# Patient Record
Sex: FEMALE | Race: WHITE | NOT HISPANIC OR LATINO | URBAN - METROPOLITAN AREA
[De-identification: names, ages, dates, MRNs, and addresses within clinical notes are randomized per-mention and may not be internally consistent; named-entity substitution may affect disease eponyms.]

---

## 2022-02-03 ENCOUNTER — NEW PATIENT (OUTPATIENT)
Dept: URBAN - METROPOLITAN AREA CLINIC 6 | Facility: CLINIC | Age: 67
End: 2022-02-03

## 2022-02-03 DIAGNOSIS — Z96.1: ICD-10-CM

## 2022-02-03 DIAGNOSIS — H40.041: ICD-10-CM

## 2022-02-03 DIAGNOSIS — H40.1111: ICD-10-CM

## 2022-02-03 DIAGNOSIS — H25.812: ICD-10-CM

## 2022-02-03 PROCEDURE — 92004 COMPRE OPH EXAM NEW PT 1/>: CPT

## 2022-02-03 PROCEDURE — 92020 GONIOSCOPY: CPT

## 2022-02-03 PROCEDURE — 92202 OPSCPY EXTND ON/MAC DRAW: CPT

## 2022-02-03 PROCEDURE — 92133 CPTRZD OPH DX IMG PST SGM ON: CPT

## 2022-02-03 PROCEDURE — 76514 ECHO EXAM OF EYE THICKNESS: CPT

## 2022-02-03 ASSESSMENT — TONOMETRY
OD_IOP_MMHG: 20
OS_IOP_MMHG: 15

## 2022-02-03 ASSESSMENT — VISUAL ACUITY
OD_CC: J5
OD_CC: 20/60+2
OS_PH: 20/40
OS_CC: 20/50
OS_CC: J2

## 2022-03-17 ENCOUNTER — IOP CHECK (OUTPATIENT)
Dept: URBAN - METROPOLITAN AREA CLINIC 6 | Facility: CLINIC | Age: 67
End: 2022-03-17

## 2022-03-17 DIAGNOSIS — Z96.1: ICD-10-CM

## 2022-03-17 DIAGNOSIS — H40.041: ICD-10-CM

## 2022-03-17 DIAGNOSIS — H25.812: ICD-10-CM

## 2022-03-17 DIAGNOSIS — H40.1111: ICD-10-CM

## 2022-03-17 PROCEDURE — 92083 EXTENDED VISUAL FIELD XM: CPT

## 2022-03-17 PROCEDURE — 92012 INTRM OPH EXAM EST PATIENT: CPT

## 2022-03-17 ASSESSMENT — VISUAL ACUITY
OS_PH: 20/30+2
OD_CC: 20/50+2
OS_CC: 20/40-2

## 2022-03-17 ASSESSMENT — TONOMETRY
OS_IOP_MMHG: 28
OD_IOP_MMHG: 21

## 2022-04-28 ENCOUNTER — IOP CHECK (OUTPATIENT)
Dept: URBAN - METROPOLITAN AREA CLINIC 6 | Facility: CLINIC | Age: 67
End: 2022-04-28

## 2022-04-28 DIAGNOSIS — H40.1111: ICD-10-CM

## 2022-04-28 DIAGNOSIS — H40.041: ICD-10-CM

## 2022-04-28 PROCEDURE — 92012 INTRM OPH EXAM EST PATIENT: CPT

## 2022-04-28 ASSESSMENT — VISUAL ACUITY
OD_PH: 20/50
OS_PH: 20/30
OS_CC: 20/40
OD_CC: 20/60

## 2022-04-28 ASSESSMENT — TONOMETRY
OS_IOP_MMHG: 21
OD_IOP_MMHG: 45

## 2023-07-05 ENCOUNTER — APPOINTMENT (OUTPATIENT)
Dept: RADIOLOGY | Facility: MEDICAL CENTER | Age: 68
End: 2023-07-05
Payer: MEDICARE

## 2023-07-05 ENCOUNTER — OFFICE VISIT (OUTPATIENT)
Dept: OBGYN CLINIC | Facility: MEDICAL CENTER | Age: 68
End: 2023-07-05
Payer: MEDICARE

## 2023-07-05 VITALS
BODY MASS INDEX: 29.94 KG/M2 | DIASTOLIC BLOOD PRESSURE: 78 MMHG | HEART RATE: 74 BPM | WEIGHT: 175.4 LBS | SYSTOLIC BLOOD PRESSURE: 136 MMHG | HEIGHT: 64 IN

## 2023-07-05 DIAGNOSIS — M25.562 LEFT KNEE PAIN, UNSPECIFIED CHRONICITY: ICD-10-CM

## 2023-07-05 DIAGNOSIS — Z01.89 ENCOUNTER FOR LOWER EXTREMITY COMPARISON IMAGING STUDY: ICD-10-CM

## 2023-07-05 DIAGNOSIS — M17.12 PRIMARY OSTEOARTHRITIS OF LEFT KNEE: Primary | ICD-10-CM

## 2023-07-05 DIAGNOSIS — Z01.818 PREOPERATIVE TESTING: ICD-10-CM

## 2023-07-05 DIAGNOSIS — M17.12 PRIMARY OSTEOARTHRITIS OF LEFT KNEE: ICD-10-CM

## 2023-07-05 DIAGNOSIS — Z01.818 PREOPERATIVE TESTING: Primary | ICD-10-CM

## 2023-07-05 PROCEDURE — 99204 OFFICE O/P NEW MOD 45 MIN: CPT | Performed by: ORTHOPAEDIC SURGERY

## 2023-07-05 PROCEDURE — 77073 BONE LENGTH STUDIES: CPT

## 2023-07-05 PROCEDURE — 73564 X-RAY EXAM KNEE 4 OR MORE: CPT

## 2023-07-05 PROCEDURE — 73560 X-RAY EXAM OF KNEE 1 OR 2: CPT

## 2023-07-05 RX ORDER — SODIUM CHLORIDE 9 MG/ML
75 INJECTION, SOLUTION INTRAVENOUS CONTINUOUS
OUTPATIENT
Start: 2023-07-05

## 2023-07-05 RX ORDER — CHLORHEXIDINE GLUCONATE 4 G/100ML
SOLUTION TOPICAL DAILY PRN
OUTPATIENT
Start: 2023-07-05

## 2023-07-05 RX ORDER — ASCORBIC ACID 500 MG
500 TABLET ORAL DAILY
Qty: 30 TABLET | Refills: 1 | Status: SHIPPED | OUTPATIENT
Start: 2023-07-05

## 2023-07-05 RX ORDER — TRANEXAMIC ACID 10 MG/ML
1000 INJECTION, SOLUTION INTRAVENOUS ONCE
OUTPATIENT
Start: 2023-07-05 | End: 2023-07-05

## 2023-07-05 RX ORDER — FERROUS SULFATE TAB EC 324 MG (65 MG FE EQUIVALENT) 324 (65 FE) MG
324 TABLET DELAYED RESPONSE ORAL DAILY
Qty: 30 TABLET | Refills: 1 | Status: SHIPPED | OUTPATIENT
Start: 2023-07-05

## 2023-07-05 RX ORDER — CEFAZOLIN SODIUM 2 G/50ML
2000 SOLUTION INTRAVENOUS ONCE
OUTPATIENT
Start: 2023-07-05 | End: 2023-07-05

## 2023-07-05 RX ORDER — FOLIC ACID 1 MG/1
1 TABLET ORAL DAILY
Qty: 30 TABLET | Refills: 1 | Status: SHIPPED | OUTPATIENT
Start: 2023-07-05

## 2023-07-05 RX ORDER — MULTIVITAMIN
1 TABLET ORAL DAILY
Qty: 30 TABLET | Refills: 1 | Status: SHIPPED | OUTPATIENT
Start: 2023-07-05

## 2023-07-05 RX ORDER — BETAMETHASONE DIPROPIONATE 0.5 MG/G
OINTMENT TOPICAL
COMMUNITY
Start: 2023-06-30

## 2023-07-05 RX ORDER — BRIMONIDINE TARTRATE 1 MG/ML
SOLUTION/ DROPS OPHTHALMIC
COMMUNITY
Start: 2023-06-30

## 2023-07-05 RX ORDER — CHLORHEXIDINE GLUCONATE 0.12 MG/ML
15 RINSE ORAL ONCE
OUTPATIENT
Start: 2023-07-05 | End: 2023-07-05

## 2023-07-05 RX ORDER — TAFLUPROST OPTHALMIC 0 MG/.3ML
SOLUTION/ DROPS OPHTHALMIC
COMMUNITY
Start: 2023-07-01

## 2023-07-05 RX ORDER — ACETAMINOPHEN 325 MG/1
975 TABLET ORAL ONCE
OUTPATIENT
Start: 2023-07-05 | End: 2023-07-05

## 2023-07-05 NOTE — PROGRESS NOTES
Assessment/Plan     1. Primary osteoarthritis of left knee    2. Left knee pain, unspecified chronicity    3. Encounter for lower extremity comparison imaging study    4. Preoperative testing      Orders Placed This Encounter   Procedures   • XR knee 4+ vw left injury   • XR knee 1 or 2 vw right   • XR bone length (scanogram)   • Comprehensive metabolic panel   • Hemoglobin A1C W/EAG Estimation   • CBC and differential   • C-reactive protein   • Anemia Panel w/Reflex   • Ambulatory referral to Physical Therapy   • Ambulatory referral to Physical Therapy   • Ambulatory referral to Internal Medicine   • Type and screen     • Patient presents with severe left knee osteoarthritis. • Discussed conservative treatment as well as risks and benefits of these treatment options. • Add in Tylenol as needed for pain. 1,000 mg up to 3 times a day. Do not exceed 3,000 mg per day. Ebenezer Gao has severe bilateral knee arthritis. She has tried conservative treatment without adequate relief. We discussed treatment options as well as risks and benefits of treatment options. The patient would like to proceed with a left total knee arthroplasty. The risks of surgery include, but are not limited to infection, blood clot, wound healing problems, blood loss, damage to blood vessels and nerves, persistent pain and stiffness, fracture, need for additional surgery, need for revision surgery, failure of hardware, heart attack, stroke, death. The patient understood and agreed to by oral and written consent. I answered all questions regarding surgery. The patient will be on ASA for DVT prophylaxis. • The patient will obtain clearance from [facility], and Cardiology if [facility] deems necessary. • Potential same day discharge. Her sister will be at home with her after surgery  Reviewed with patient to expect some numbness over the lateral aspect of the knee after surgery.   We let the patient know to avoid kneeling while the incision is healing, typically for the first 4-6 weeks. Once incision is healed kneeling if permitted but may still be uncomfortable for some patients long term. Reviewed no driving for 4-6 weeks for right sided surgery once off narcotics. No driving until off narcotics for left sided surgery. Preoperative vitamins were prescribed. Patient instructed to  what they are not already taking and start 30 days before surgery. We let the patient know that some people experience constipation while on iron. If that occurs can take iron every other day. If still an issue can stop the iron. Can also add in OTC medication and/or prune juice for constipation. • We also let the patient know that some people experience constipation after surgery while on narcotics. We suggest to have OTC medications and/or prune juice available if needed. Return for postop appt. I answered all of the patient's questions during the visit and provided education of the patient's condition during the visit. The patient verbalized understanding of the information given and agrees with the plan. This note was dictated using [employer] software. It may contain errors including improperly dictated words. Please contact physician directly for any questions. History of Present Illness   Chief complaint:   Chief Complaint   Patient presents with   • Left Knee - Pain       HPI: Teofilo Mg is a 75 y.o. female that c/o left knee pain. Pain has been present for many years, as she has been working retail for 20 years and notes pain and instability in her knee throughout this time. Pain is located posterior, and described as a dull "zing", although her pain complaint is the instability in her knee. Patient denies any previous injuries or surgeries. Pain is aggravated  by quick movements, and symptoms include swelling, clicking, and isntability. Patient denies any radiating pain or distal paresthesias.  Pain is alleviated by non weight-bearing, ice, Tylenol, ibuprofen. Patient has not initiated PT or injections, and has tried compression sleeves with no recent relief. She is currently ambulating with a cane. She notes a bad experience with attempting an injection to the right knee years ago and would not like to repeat that experience. She is interested in pursuing a left knee replacement. History of MRSA: no  History of blood clots: no  Family history of blood clots: no  History of Hepatitis C:no  History of HIV: no  Are you a smoker:no  Are you diabetic:no  Do you see a cardiologist: no  Have you been vaccinated for COVID:yes  Have you seen a dentist in the past year: yes         ROS:    See [facility] for musculoskeletal review. All other systems reviewed are negative     Historical Information   Past Medical History:   Diagnosis Date   • Left knee pain      Past Surgical History:   Procedure Laterality Date   • EYE SURGERY     • KNEE SURGERY       Social History   Social History     Substance and Sexual Activity   Alcohol Use None     Social History     Substance and Sexual Activity   Drug Use Not on file     Social History     Tobacco Use   Smoking Status Not on file   Smokeless Tobacco Not on file     Family History: History reviewed. No pertinent family history. Current Outpatient Medications on File Prior to Visit   Medication Sig Dispense Refill   • Alphagan P 0.1 % INSTILL ONE DROP INTO THE RIGHT EYE EVERY 12 HOURS     • betamethasone, augmented, (DIPROLENE) 0.05 % ointment      • Tafluprost, PF, 0.0015 % SOLN        No current facility-administered medications on file prior to visit. Allergies   Allergen Reactions   • Other Other (See Comments) and Rash     Pt states "it turned me red from my neck up".        Current Outpatient Medications on File Prior to Visit   Medication Sig Dispense Refill   • Alphagan P 0.1 % INSTILL ONE DROP INTO THE RIGHT EYE EVERY 12 HOURS     • betamethasone, augmented, (DIPROLENE) 0.05 % ointment • Tafluprost, PF, 0.0015 % SOLN        No current facility-administered medications on file prior to visit. Objective   Vitals: Blood pressure 136/78, pulse 74, height 5' 4" (1.626 m), weight 79.6 kg (175 lb 6.4 oz). ,Body mass index is 30.11 kg/m².     PE:  AAOx 3  WDWN  Hearing intact, no drainage from eyes  Regular rate  no audible wheezing  no abdominal distension  LE compartments soft, skin intact    leftknee:    Appearance:  no swelling   No ecchymosis  no obvious joint deformity   No effusion  Palpation/Tenderness:  No TTP over medial joint line  No TTP over lateral joint line   No TTP over patella  No TTP over patellar tendon  + TTP over pes anserine bursa  Active Range of Motion:  AROM:   PROM:    Special Tests:  Patellar grind:  Negative  Valgus Stress Test:  negative  Varus Stress Test:  negative     No ipsilateral hip pain with ROM    leftLE:    Sensation grossly intact  Palpable pulse  AT/GS/EHL intact    RLE:  EHL/AT/GS/quads/hamstrings/iliopsoas 5/5, sensation grossly intact L4, L5, S1, palpable pedal pulse  LLE:  EHL/AT/GS/quads/hamstrings/iliopsoas 5/5, sensation grossly intact L4, L5, S1, palpable pedal pulse      Imaging Studies: I have personally reviewed pertinent films in PACS  XR leftknee:    Severe bilateral knee arthritis with joint space narrowing and osteophyte formation      Scribe Attestation    I,:  Melania Mota am acting as a scribe while in the presence of the attending physician.:       I,:  Merline Daniel, DO personally performed the services described in this documentation    as scribed in my presence.:

## 2023-07-13 ENCOUNTER — EVALUATION (OUTPATIENT)
Dept: PHYSICAL THERAPY | Facility: REHABILITATION | Age: 68
End: 2023-07-13
Payer: MEDICARE

## 2023-07-13 DIAGNOSIS — M17.12 PRIMARY OSTEOARTHRITIS OF LEFT KNEE: Primary | ICD-10-CM

## 2023-07-13 PROCEDURE — 97161 PT EVAL LOW COMPLEX 20 MIN: CPT | Performed by: PHYSICAL THERAPIST

## 2023-07-13 PROCEDURE — 97110 THERAPEUTIC EXERCISES: CPT | Performed by: PHYSICAL THERAPIST

## 2023-07-13 NOTE — PROGRESS NOTES
PT Evaluation     Today's date: 2023  Patient name: Kaitlin Arriaga  : 1955  MRN: [id number]  Referring provider: Carlos Potter  Dx:   Encounter Diagnosis     ICD-10-CM    1. Primary osteoarthritis of left knee  M17.12 Ambulatory referral to Physical Therapy                     Assessment  Assessment details: Kaitlin Arriaga is a 75 y.o. female referred to outpt PT with dx of left knee OA with TKA scheduled 10/25/23. Pt presents with decrease in left knee ROM flex and ext with positive pain. Pt funct is limited with decrease in tolerance to ambulation, sit to stand transfers, and diff getting up from floor. Pt was issued supplemental HEP at today's visit to assist and focus on ROM. Discussed with pt regarding pain control and when it's okay to push through ROM and pain versus when to be less aggressive. Will f/u for pre-op PT visit closer to surgery date. Impairments: lacks appropriate home exercise program    Goals  Independent with HEP x1 visit-met    Plan  Planned therapy interventions: home exercise program        Subjective Evaluation    History of Present Illness  Mechanism of injury: Pt reports having severe left knee OA and is scheduled for TKA 10/25/23. When pt went to see ortho, she recommended that she have some PT prior to surgery to assist with extension. Pt had right meniscus 15 years prior and feels she has been modifying since for bilat knee sx's. Pt has diff with sit to stand transfers in which she needs assistance from her UE. 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 Pt is currently sleeping with pillow between knees with good tolerance and min sleep disturbances. Pt is using tylenol to assist with sx's. Pt is using SPC when amb for longer duration and when out in community, no use of AD within short distances within her home.   Pt avoids motions of bilat knee secondary to pain and diff with movements. Pt does feel unsteady initially when putting pressure through left LE, but dissipates quickly and denies any falls. Pain  Current pain ratin  At best pain ratin  At worst pain ratin      Diagnostic Tests  X-ray: abnormal        Objective     Active Range of Motion   Left Knee   Flexion: 85 degrees   Extension: -25 degrees     Passive Range of Motion   Left Knee   Flexion: 90 degrees   Extension: -20 degrees     Strength/Myotome Testing     Left Knee   Flexion: 5  Extension: 4+    Right Knee   Flexion: 5  Extension: 5    Additional Strength Details  SLR without lag but restricted into extension due to ROM limitations.             Precautions: None        Manual  23                                                                                   Neuro Re-Ed                                                                                                  Therex            Knee ext TERT 2 min x3          heel slides sitting 5"x10                                                                                                               Gait Training                                 Modalities

## 2023-08-14 ENCOUNTER — LAB REQUISITION (OUTPATIENT)
Dept: LAB | Facility: HOSPITAL | Age: 68
End: 2023-08-14
Payer: MEDICARE

## 2023-08-14 ENCOUNTER — OFFICE VISIT (OUTPATIENT)
Dept: LAB | Facility: HOSPITAL | Age: 68
End: 2023-08-14
Payer: MEDICARE

## 2023-08-14 ENCOUNTER — APPOINTMENT (OUTPATIENT)
Dept: LAB | Facility: HOSPITAL | Age: 68
End: 2023-08-14
Payer: MEDICARE

## 2023-08-14 DIAGNOSIS — Z01.818 ENCOUNTER FOR PREADMISSION TESTING: ICD-10-CM

## 2023-08-14 DIAGNOSIS — M25.562 LEFT KNEE PAIN, UNSPECIFIED CHRONICITY: ICD-10-CM

## 2023-08-14 DIAGNOSIS — Z01.818 PREOPERATIVE TESTING: ICD-10-CM

## 2023-08-14 DIAGNOSIS — Z01.818 ENCOUNTER FOR OTHER PREPROCEDURAL EXAMINATION: ICD-10-CM

## 2023-08-14 DIAGNOSIS — M17.12 PRIMARY OSTEOARTHRITIS OF LEFT KNEE: ICD-10-CM

## 2023-08-14 LAB
ABO GROUP BLD: NORMAL
ALBUMIN SERPL BCP-MCNC: 4.4 G/DL (ref 3.5–5)
ALP SERPL-CCNC: 52 U/L (ref 34–104)
ALT SERPL W P-5'-P-CCNC: 15 U/L (ref 7–52)
ANION GAP SERPL CALCULATED.3IONS-SCNC: 10 MMOL/L
APTT PPP: 32 SECONDS (ref 23–37)
AST SERPL W P-5'-P-CCNC: 20 U/L (ref 13–39)
BASOPHILS # BLD AUTO: 0.04 THOUSANDS/ÂΜL (ref 0–0.1)
BASOPHILS NFR BLD AUTO: 1 % (ref 0–1)
BILIRUB SERPL-MCNC: 0.49 MG/DL (ref 0.2–1)
BLD GP AB SCN SERPL QL: NEGATIVE
BUN SERPL-MCNC: 17 MG/DL (ref 5–25)
CALCIUM SERPL-MCNC: 9.4 MG/DL (ref 8.4–10.2)
CHLORIDE SERPL-SCNC: 102 MMOL/L (ref 96–108)
CO2 SERPL-SCNC: 26 MMOL/L (ref 21–32)
CREAT SERPL-MCNC: 0.89 MG/DL (ref 0.6–1.3)
CRP SERPL QL: 2.6 MG/L
EOSINOPHIL # BLD AUTO: 0.1 THOUSAND/ÂΜL (ref 0–0.61)
EOSINOPHIL NFR BLD AUTO: 1 % (ref 0–6)
ERYTHROCYTE [DISTWIDTH] IN BLOOD BY AUTOMATED COUNT: 12.7 % (ref 11.6–15.1)
EST. AVERAGE GLUCOSE BLD GHB EST-MCNC: 111 MG/DL
FERRITIN SERPL-MCNC: 210 NG/ML (ref 11–307)
GFR SERPL CREATININE-BSD FRML MDRD: 66 ML/MIN/1.73SQ M
GLUCOSE P FAST SERPL-MCNC: 83 MG/DL (ref 65–99)
HBA1C MFR BLD: 5.5 %
HCT VFR BLD AUTO: 43.8 % (ref 34.8–46.1)
HGB BLD-MCNC: 14.1 G/DL (ref 11.5–15.4)
IMM GRANULOCYTES # BLD AUTO: 0.05 THOUSAND/UL (ref 0–0.2)
IMM GRANULOCYTES NFR BLD AUTO: 1 % (ref 0–2)
INR PPP: 0.97 (ref 0.84–1.19)
IRON SATN MFR SERPL: 30 % (ref 15–50)
IRON SERPL-MCNC: 83 UG/DL (ref 50–170)
LYMPHOCYTES # BLD AUTO: 2.59 THOUSANDS/ÂΜL (ref 0.6–4.47)
LYMPHOCYTES NFR BLD AUTO: 35 % (ref 14–44)
MCH RBC QN AUTO: 28.5 PG (ref 26.8–34.3)
MCHC RBC AUTO-ENTMCNC: 32.2 G/DL (ref 31.4–37.4)
MCV RBC AUTO: 89 FL (ref 82–98)
MONOCYTES # BLD AUTO: 0.85 THOUSAND/ÂΜL (ref 0.17–1.22)
MONOCYTES NFR BLD AUTO: 12 % (ref 4–12)
NEUTROPHILS # BLD AUTO: 3.7 THOUSANDS/ÂΜL (ref 1.85–7.62)
NEUTS SEG NFR BLD AUTO: 50 % (ref 43–75)
NRBC BLD AUTO-RTO: 0 /100 WBCS
PLATELET # BLD AUTO: 248 THOUSANDS/UL (ref 149–390)
PMV BLD AUTO: 9.4 FL (ref 8.9–12.7)
POTASSIUM SERPL-SCNC: 3.8 MMOL/L (ref 3.5–5.3)
PROT SERPL-MCNC: 6.8 G/DL (ref 6.4–8.4)
PROTHROMBIN TIME: 13.2 SECONDS (ref 11.6–14.5)
RBC # BLD AUTO: 4.95 MILLION/UL (ref 3.81–5.12)
RH BLD: NEGATIVE
SODIUM SERPL-SCNC: 138 MMOL/L (ref 135–147)
SPECIMEN EXPIRATION DATE: NORMAL
TIBC SERPL-MCNC: 275 UG/DL (ref 250–450)
WBC # BLD AUTO: 7.33 THOUSAND/UL (ref 4.31–10.16)

## 2023-08-14 PROCEDURE — 85025 COMPLETE CBC W/AUTO DIFF WBC: CPT

## 2023-08-14 PROCEDURE — 86850 RBC ANTIBODY SCREEN: CPT | Performed by: ORTHOPAEDIC SURGERY

## 2023-08-14 PROCEDURE — 93005 ELECTROCARDIOGRAM TRACING: CPT

## 2023-08-14 PROCEDURE — 86140 C-REACTIVE PROTEIN: CPT

## 2023-08-14 PROCEDURE — 86901 BLOOD TYPING SEROLOGIC RH(D): CPT | Performed by: ORTHOPAEDIC SURGERY

## 2023-08-14 PROCEDURE — 83550 IRON BINDING TEST: CPT

## 2023-08-14 PROCEDURE — 85730 THROMBOPLASTIN TIME PARTIAL: CPT

## 2023-08-14 PROCEDURE — 82728 ASSAY OF FERRITIN: CPT

## 2023-08-14 PROCEDURE — 83540 ASSAY OF IRON: CPT

## 2023-08-14 PROCEDURE — 36415 COLL VENOUS BLD VENIPUNCTURE: CPT

## 2023-08-14 PROCEDURE — 85610 PROTHROMBIN TIME: CPT

## 2023-08-14 PROCEDURE — 86900 BLOOD TYPING SEROLOGIC ABO: CPT | Performed by: ORTHOPAEDIC SURGERY

## 2023-08-14 PROCEDURE — 80053 COMPREHEN METABOLIC PANEL: CPT

## 2023-08-14 PROCEDURE — 83036 HEMOGLOBIN GLYCOSYLATED A1C: CPT

## 2023-08-15 LAB
ATRIAL RATE: 71 BPM
P AXIS: 57 DEGREES
PR INTERVAL: 134 MS
QRS AXIS: -1 DEGREES
QRSD INTERVAL: 82 MS
QT INTERVAL: 396 MS
QTC INTERVAL: 430 MS
T WAVE AXIS: 23 DEGREES
VENTRICULAR RATE: 71 BPM

## 2023-08-15 PROCEDURE — 93010 ELECTROCARDIOGRAM REPORT: CPT | Performed by: INTERNAL MEDICINE

## 2023-08-25 ENCOUNTER — TELEPHONE (OUTPATIENT)
Dept: OBGYN CLINIC | Facility: HOSPITAL | Age: 68
End: 2023-08-25

## 2023-08-25 NOTE — TELEPHONE ENCOUNTER
Caller: patient  Doctor: Rayne Martinez    Reason for call: returning Kaci's call     Call back#: [phone]

## 2023-08-28 PROBLEM — H25.11 NUCLEAR SCLEROTIC CATARACT OF RIGHT EYE: Status: ACTIVE | Noted: 2020-10-27

## 2023-08-28 PROBLEM — E66.811 CLASS 1 OBESITY DUE TO EXCESS CALORIES WITHOUT SERIOUS COMORBIDITY WITH BODY MASS INDEX (BMI) OF 30.0 TO 30.9 IN ADULT: Status: ACTIVE | Noted: 2023-08-28

## 2023-08-28 PROBLEM — E66.09 CLASS 1 OBESITY DUE TO EXCESS CALORIES WITHOUT SERIOUS COMORBIDITY WITH BODY MASS INDEX (BMI) OF 30.0 TO 30.9 IN ADULT: Status: ACTIVE | Noted: 2023-08-28

## 2023-08-28 PROBLEM — M17.12 PRIMARY OSTEOARTHRITIS OF LEFT KNEE: Status: ACTIVE | Noted: 2023-08-28

## 2023-08-28 NOTE — PATIENT INSTRUCTIONS
Contact surgical nurse  navigator with any questions regarding preoperative plan or schedule.   Stop all over the counter supplements, herbal, naturopathic  medications for 1 week prior to surgery UNLESS prescribed by your surgeon  Hold NSAIDS (i.e. advil, alleve, motrin, ibuprofen, celebrex) minimum 7 days prior to surgery  Hold Asprin minimum 7 days prior to surgery  Recommend using Tylenol ( acetaminophen ) 500mg every eight hours during the first week post discharge in conjunction with any additional pain medicine prescribed by your surgeon  Use bowel medicines prescribed by your surgeon ( colace) daily post op during the first 1-2 weeks to avoid post operative constipation issues  Call [phone] with any post discharge concerns or medical issues  The morning of surgery take only the following medication with small sip of water: none

## 2023-08-28 NOTE — H&P (VIEW-ONLY)
Internal Medicine Pre-Operative Evaluation:     Reason for Visit: Pre-operative Evaluation for Risk Stratification and Optimization    Patient ID: Fidencio Richardson is a 75 y.o. female. Surgery: Arthroplasty of left knee  Referring Provider: Dr. Keesha Lorenzana      Recommendations to Proceed withSurgery    Patient is considered to be Low risk for Medium risk procedure. After evaluation and discussion with patient with emphasis that all surgery has some degree of inherent risk it is determined this procedure is of acceptable risk  medically. Patient may proceed with planned procedure      Assessment      Primary osteoarthritis left knee  • Failed conservative measures  • Electing to undergo total joint arthroplasty    Pre-operative Medical Evaluation for planned surgery  • Patient meets preoperative quality goals as noted below  • Recommendations as listed in PLAN section below  • Contact surgical nurse  navigator with any questions regarding preoperative plan or schedule. Obesity  • Recommend ongoing attempts at weight loss  • Current BMI 29          Patient Active Problem List   Diagnosis   • Nuclear sclerotic cataract of right eye   • Primary osteoarthritis of left knee   • Class 1 obesity due to excess calories without serious comorbidity with body mass index (BMI) of 30.0 to 30.9 in adult        Plan:     1. Further preoperative workup as follows:   - none no further testing required may proceed with surgery    2. Medication Management/Recommendations:   - continue all current medicines through morning of surgery with sip of water except the following:  - hold aspirin 7 days prior to surgery  - avoid use of NSAID such as motrin, advil, aleve for 7 days prior to surgery  - hold all OTC herbal or nutritional supplements 7 days before surgery    3. Patient requires further consultation with:   No Consults Required    4.  Discharge Planning / Barriers to Discharge  none identified - patients has post discharge therapy plan in place, transportation arranged for discharge day, adequate family support at home to assist with discharge to home. Subjective:           History of Present Illness:     Lesa Padilla is a 75 y.o. female who presents to the office today for a preoperative consultation at the request of surgeon. The patient understands this is an elective procedure and not emergent. They are electing to undergo planned procedure with an understanding that all surgery has inherent risk. They have worked with their surgeon and failed conservative treatment measures. Today they present for preoperative risk assessment and recommendations for optimization in preparation for surgery. Pt seen in surgical optimization center for upcoming proposed surgery. They have failed previous conservative measures and have elected surgical intervention. Pt meets presurgical lab and BMI optimization goals. Upon interview questioning patient is able to perform greater than 4 METs workload in daily life without any reported cardio-pulmonary symptoms. ROS: No TIA's or unusual headaches, no dysphagia. No prolonged cough. No dyspnea or chest pain on exertion. No abdominal pain, change in bowel habits, black or bloody stools. No urinary tract or BPH symptoms. Positive reported pain in arthritic joint. Positive difficulty with gait. No skin rashes or issues.             Objective:      /82   Ht 5' 4" (1.626 m)   Wt 78.5 kg (173 lb)   BMI 29.70 kg/m²       General Appearance: no distress, conversive  HEENT: PERRLA, conjuctiva normal; oropharynx clear; mucous membranes moist;   Neck:  Supple, no lymphadenopathy or thyromegaly  Lungs: breath sounds normal, normal respiratory effort, no retractions, expiratory effort normal  CV: normal heart sounds S1/S2, PMI normal   ABD: soft non tender, no masses , no hepatic or splenomegaly  EXT: DP pulses intact, no lymphadenopathy, no edema  Skin: normal turgor, normal texture, no rash  Psych: affect normal, mood normal  Neuro: AAOx3        The following portions of the patient's history were reviewed and updated as appropriate: allergies, current medications, past family history, past medical history, past social history, past surgical history and problem list.     Past History:       Past Medical History:   Diagnosis Date   • Left knee pain     Past Surgical History:   Procedure Laterality Date   • EYE SURGERY     • KNEE SURGERY               History reviewed. No pertinent family history. Allergies: Allergies   Allergen Reactions   • Other Other (See Comments) and Rash     Pt states "it turned me red from my neck up". Current Medications:     Current Outpatient Medications   Medication Instructions   • Alphagan P 0.1 % INSTILL ONE DROP INTO THE RIGHT EYE EVERY 12 HOURS   • ascorbic acid (VITAMIN C) 500 mg, Oral, Daily, Begin 30 days prior to surgery   • betamethasone, augmented, (DIPROLENE) 0.05 % ointment No dose, route, or frequency recorded. • ferrous sulfate 324 mg, Oral, Daily, Begin 30 days prior to surgery   • folic acid (KP FOLIC ACID) 1 mg, Oral, Daily, Begin 30 days prior to surgery   • Multiple Vitamin (multivitamin) tablet 1 tablet, Oral, Daily, Begin 30 days prior to surgery   • Tafluprost, PF, 0.0015 % SOLN No dose, route, or frequency recorded. PRE-OP WORKSHEET DATA    Assessment of Pre-Operative Risks     MLJ Quality Hard Stops:  BMI (<40) : Estimated body mass index is 29.7 kg/m² as calculated from the following:    Height as of this encounter: 5' 4" (1.626 m). Weight as of this encounter: 78.5 kg (173 lb). Hgb ( >11):    Lab Results   Component Value Date    HGB 14.1 08/14/2023     HbA1c (<7.0) :   Lab Results   Component Value Date    HGBA1C 5.5 08/14/2023     GFR (>60) (Less then 45 = Nephrology consult):  CrCl cannot be calculated (Patient's most recent lab result is older than the maximum 7 days allowed. ). Active Decompensated Chronic Conditions which would delay surgery  No acutely decompensated medical issues such as recent CVA, MI, new onset arrhythmia, severe aortic stenosis, CHF, uncontrolled COPD       Exercise Capacity: (if less the 4 mets consider functional assessment via cardiac stress testing or consultation)    · Able to walk 2 blocks without symptoms?: Yes  · Able to walk 1 flights without symptoms?: Yes    Assessment of intra and post operative respiratory, hemodynamic and thrombotic risks     Prior Anesthesia Reactions: No     Personal history of venous thromboembolic disease? No    History of steroid use > 5 mg for >2 weeks within last year? No    Cardiac Risk Estimation: per the Revised Cardiac Risk Index (Circ. 100:1043, 1999),     The patient's risk factors for cardiac complications include :  none    Millie Ramirez has a in hospital cardiac risk of RCI RISK CLASS I (0 risk factors, risk of major cardiac compl. appr. 0.5%) based on RCRI calculator          Pre-Op Data Reviewed:       Laboratory Results: I have personally reviewed the pertinent laboratory results/reports     EKG:I have personally reviewed pertinent reports. . I personally reviewed and interpreted available tracings in the electronic medical record    Normal sinus rhythm  Possible Left atrial enlargement  Borderline ECG  No previous ECGs available  Confirmed by Cathy Restrepo ([id number]) on 8/15/2023     OLD RECORDS: reviewed old records in the chart review section if EHR on day of visit.     Previous cardiopulmonary studies within the past year:  · Echocardiogram: no  · Cardiac Catheterization: no  · Stress Test: no      Time of visit including pre-visit chart review, visit and post-visit coordination of plan and care , review of pre-surgical lab work, preparation and time spent documenting note in electronic medical record, time spent face-to-face in physical examination answering patient questions by care team 45 minutes             [facility]

## 2023-08-28 NOTE — PROGRESS NOTES
Internal Medicine Pre-Operative Evaluation:     Reason for Visit: Pre-operative Evaluation for Risk Stratification and Optimization    Patient ID: Jahaira Smith is a 75 y.o. female. Surgery: Arthroplasty of left knee  Referring Provider: Dr. Krissy Pan      Recommendations to Proceed withSurgery    Patient is considered to be Low risk for Medium risk procedure. After evaluation and discussion with patient with emphasis that all surgery has some degree of inherent risk it is determined this procedure is of acceptable risk  medically. Patient may proceed with planned procedure      Assessment      Primary osteoarthritis left knee  • Failed conservative measures  • Electing to undergo total joint arthroplasty    Pre-operative Medical Evaluation for planned surgery  • Patient meets preoperative quality goals as noted below  • Recommendations as listed in PLAN section below  • Contact surgical nurse  navigator with any questions regarding preoperative plan or schedule. Obesity  • Recommend ongoing attempts at weight loss  • Current BMI 29          Patient Active Problem List   Diagnosis   • Nuclear sclerotic cataract of right eye   • Primary osteoarthritis of left knee   • Class 1 obesity due to excess calories without serious comorbidity with body mass index (BMI) of 30.0 to 30.9 in adult        Plan:     1. Further preoperative workup as follows:   - none no further testing required may proceed with surgery    2. Medication Management/Recommendations:   - continue all current medicines through morning of surgery with sip of water except the following:  - hold aspirin 7 days prior to surgery  - avoid use of NSAID such as motrin, advil, aleve for 7 days prior to surgery  - hold all OTC herbal or nutritional supplements 7 days before surgery    3. Patient requires further consultation with:   No Consults Required    4.  Discharge Planning / Barriers to Discharge  none identified - patients has post discharge therapy plan in place, transportation arranged for discharge day, adequate family support at home to assist with discharge to home. Subjective:           History of Present Illness:     Buster Quintero is a 75 y.o. female who presents to the office today for a preoperative consultation at the request of surgeon. The patient understands this is an elective procedure and not emergent. They are electing to undergo planned procedure with an understanding that all surgery has inherent risk. They have worked with their surgeon and failed conservative treatment measures. Today they present for preoperative risk assessment and recommendations for optimization in preparation for surgery. Pt seen in surgical optimization center for upcoming proposed surgery. They have failed previous conservative measures and have elected surgical intervention. Pt meets presurgical lab and BMI optimization goals. Upon interview questioning patient is able to perform greater than 4 METs workload in daily life without any reported cardio-pulmonary symptoms. ROS: No TIA's or unusual headaches, no dysphagia. No prolonged cough. No dyspnea or chest pain on exertion. No abdominal pain, change in bowel habits, black or bloody stools. No urinary tract or BPH symptoms. Positive reported pain in arthritic joint. Positive difficulty with gait. No skin rashes or issues.             Objective:      /82   Ht 5' 4" (1.626 m)   Wt 78.5 kg (173 lb)   BMI 29.70 kg/m²       General Appearance: no distress, conversive  HEENT: PERRLA, conjuctiva normal; oropharynx clear; mucous membranes moist;   Neck:  Supple, no lymphadenopathy or thyromegaly  Lungs: breath sounds normal, normal respiratory effort, no retractions, expiratory effort normal  CV: normal heart sounds S1/S2, PMI normal   ABD: soft non tender, no masses , no hepatic or splenomegaly  EXT: DP pulses intact, no lymphadenopathy, no edema  Skin: normal turgor, normal texture, no rash  Psych: affect normal, mood normal  Neuro: AAOx3        The following portions of the patient's history were reviewed and updated as appropriate: allergies, current medications, past family history, past medical history, past social history, past surgical history and problem list.     Past History:       Past Medical History:   Diagnosis Date   • Left knee pain     Past Surgical History:   Procedure Laterality Date   • EYE SURGERY     • KNEE SURGERY               History reviewed. No pertinent family history. Allergies: Allergies   Allergen Reactions   • Other Other (See Comments) and Rash     Pt states "it turned me red from my neck up". Current Medications:     Current Outpatient Medications   Medication Instructions   • Alphagan P 0.1 % INSTILL ONE DROP INTO THE RIGHT EYE EVERY 12 HOURS   • ascorbic acid (VITAMIN C) 500 mg, Oral, Daily, Begin 30 days prior to surgery   • betamethasone, augmented, (DIPROLENE) 0.05 % ointment No dose, route, or frequency recorded. • ferrous sulfate 324 mg, Oral, Daily, Begin 30 days prior to surgery   • folic acid (KP FOLIC ACID) 1 mg, Oral, Daily, Begin 30 days prior to surgery   • Multiple Vitamin (multivitamin) tablet 1 tablet, Oral, Daily, Begin 30 days prior to surgery   • Tafluprost, PF, 0.0015 % SOLN No dose, route, or frequency recorded. PRE-OP WORKSHEET DATA    Assessment of Pre-Operative Risks     MLJ Quality Hard Stops:  BMI (<40) : Estimated body mass index is 29.7 kg/m² as calculated from the following:    Height as of this encounter: 5' 4" (1.626 m). Weight as of this encounter: 78.5 kg (173 lb). Hgb ( >11):    Lab Results   Component Value Date    HGB 14.1 08/14/2023     HbA1c (<7.0) :   Lab Results   Component Value Date    HGBA1C 5.5 08/14/2023     GFR (>60) (Less then 45 = Nephrology consult):  CrCl cannot be calculated (Patient's most recent lab result is older than the maximum 7 days allowed. ). Active Decompensated Chronic Conditions which would delay surgery  No acutely decompensated medical issues such as recent CVA, MI, new onset arrhythmia, severe aortic stenosis, CHF, uncontrolled COPD       Exercise Capacity: (if less the 4 mets consider functional assessment via cardiac stress testing or consultation)    · Able to walk 2 blocks without symptoms?: Yes  · Able to walk 1 flights without symptoms?: Yes    Assessment of intra and post operative respiratory, hemodynamic and thrombotic risks     Prior Anesthesia Reactions: No     Personal history of venous thromboembolic disease? No    History of steroid use > 5 mg for >2 weeks within last year? No    Cardiac Risk Estimation: per the Revised Cardiac Risk Index (Circ. 100:1043, 1999),     The patient's risk factors for cardiac complications include :  none    Emre De Santiago has a in hospital cardiac risk of RCI RISK CLASS I (0 risk factors, risk of major cardiac compl. appr. 0.5%) based on RCRI calculator          Pre-Op Data Reviewed:       Laboratory Results: I have personally reviewed the pertinent laboratory results/reports     EKG:I have personally reviewed pertinent reports. . I personally reviewed and interpreted available tracings in the electronic medical record    Normal sinus rhythm  Possible Left atrial enlargement  Borderline ECG  No previous ECGs available  Confirmed by Gayla Jorge ([id number]) on 8/15/2023     OLD RECORDS: reviewed old records in the chart review section if EHR on day of visit.     Previous cardiopulmonary studies within the past year:  · Echocardiogram: no  · Cardiac Catheterization: no  · Stress Test: no      Time of visit including pre-visit chart review, visit and post-visit coordination of plan and care , review of pre-surgical lab work, preparation and time spent documenting note in electronic medical record, time spent face-to-face in physical examination answering patient questions by care team 45 minutes             [facility]

## 2023-08-30 ENCOUNTER — OFFICE VISIT (OUTPATIENT)
Age: 68
End: 2023-08-30
Payer: MEDICARE

## 2023-08-30 VITALS
SYSTOLIC BLOOD PRESSURE: 138 MMHG | BODY MASS INDEX: 29.53 KG/M2 | HEIGHT: 64 IN | WEIGHT: 173 LBS | DIASTOLIC BLOOD PRESSURE: 82 MMHG

## 2023-08-30 DIAGNOSIS — M25.562 LEFT KNEE PAIN, UNSPECIFIED CHRONICITY: ICD-10-CM

## 2023-08-30 DIAGNOSIS — E66.09 CLASS 1 OBESITY DUE TO EXCESS CALORIES WITHOUT SERIOUS COMORBIDITY WITH BODY MASS INDEX (BMI) OF 30.0 TO 30.9 IN ADULT: ICD-10-CM

## 2023-08-30 DIAGNOSIS — Z01.818 PREOPERATIVE TESTING: ICD-10-CM

## 2023-08-30 DIAGNOSIS — M17.12 PRIMARY OSTEOARTHRITIS OF LEFT KNEE: Primary | ICD-10-CM

## 2023-08-30 DIAGNOSIS — Z01.818 PREOP EXAM FOR INTERNAL MEDICINE: ICD-10-CM

## 2023-08-30 DIAGNOSIS — H25.11 NUCLEAR SCLEROTIC CATARACT OF RIGHT EYE: ICD-10-CM

## 2023-08-30 PROCEDURE — 99204 OFFICE O/P NEW MOD 45 MIN: CPT | Performed by: INTERNAL MEDICINE

## 2023-09-06 RX ORDER — PREDNISOLONE ACETATE 10 MG/ML
1 SUSPENSION/ DROPS OPHTHALMIC ONCE
COMMUNITY

## 2023-09-06 NOTE — PRE-PROCEDURE INSTRUCTIONS
Pre-Surgery Instructions:   Medication Instructions   • Alphagan P 0.1 % Take day of surgery. • ascorbic acid (VITAMIN C) 500 MG tablet Hold day of surgery. • betamethasone, augmented, (DIPROLENE) 0.05 % ointment Hold day of surgery. • ferrous sulfate 324 (65 Fe) mg Hold day of surgery. • folic acid (KP Folic Acid) 1 mg tablet Hold day of surgery. • Multiple Vitamin (multivitamin) tablet Hold day of surgery. • prednisoLONE acetate (PRED FORTE) 1 % ophthalmic suspension Take day of surgery. • Tafluprost, PF, 0.0015 % SOLN Take day of surgery. Reviewed Total Joint Program    Medication instructions for day surgery reviewed. Please use only a sip of water to take your instructed medications. Avoid all over the counter vitamins, supplements and NSAIDS for one week prior to surgery per anesthesia guidelines. Tylenol is ok to take as needed. You will receive a call one business day prior to surgery with an arrival time and hospital directions. If your surgery is scheduled on a Monday, the hospital will be calling you on the Friday prior to your surgery. If you have not heard from anyone by 8pm, please call the hospital supervisor through the hospital  at [phone]. Jane Holbrook [phone]). Do not eat or drink anything after midnight the night before your surgery, including candy, mints, lifesavers, or chewing gum. Do not drink alcohol 24hrs before your surgery. Try not to smoke at least 24hrs before your surgery. Follow the pre surgery showering instructions as listed in the [facility] Surgical Experience Booklet” or otherwise provided by your surgeon's office. Do not shave the surgical area 24 hours before surgery. Do not apply any lotions, creams, including makeup, cologne, deodorant, or perfumes after showering on the day of your surgery. No contact lenses, eye make-up, or artificial eyelashes.  Remove nail polish, including gel polish, and any artificial, gel, or acrylic nails if possible. Remove all jewelry including rings and body piercing jewelry. Wear causal clothing that is easy to take on and off. Consider your type of surgery. Keep any valuables, jewelry, piercings at home. Please bring any specially ordered equipment (sling, braces) if indicated. Arrange for a responsible person to drive you to and from the hospital on the day of your surgery. Visitor Guidelines discussed. Call the surgeon's office with any new illnesses, exposures, or additional questions prior to surgery. Please reference your [facility] Surgical Experience Booklet” for additional information to prepare for your upcoming surgery.

## 2023-09-25 ENCOUNTER — ANESTHESIA EVENT (OUTPATIENT)
Dept: PERIOP | Facility: HOSPITAL | Age: 68
End: 2023-09-25
Payer: MEDICARE

## 2023-09-25 NOTE — ANESTHESIA PREPROCEDURE EVALUATION
Procedure:  ARTHROPLASTY KNEE TOTAL, potential same day discharge (Left: Knee)    Relevant Problems   MUSCULOSKELETAL   (+) Primary osteoarthritis of left knee        Physical Exam    Airway    Mallampati score: II  TM Distance: >3 FB  Neck ROM: full     Dental   No notable dental hx     Cardiovascular  Cardiovascular exam normal    Pulmonary  Pulmonary exam normal     Other Findings        Anesthesia Plan  ASA Score- 2     Anesthesia Type- regional with ASA Monitors. Additional Monitors:   Airway Plan: ETT. Plan Factors-Exercise tolerance (METS): >4 METS. Chart reviewed. EKG reviewed. Existing labs reviewed. Patient is not a current smoker. Patient not instructed to abstain from smoking on day of procedure. Patient did not smoke on day of surgery. Induction- intravenous. Postoperative Plan-     Informed Consent- Anesthetic plan and risks discussed with patient. I personally reviewed this patient with the CRNA. Discussed and agreed on the Anesthesia Plan with the CRNA. Addie Strong

## 2023-09-27 ENCOUNTER — HOSPITAL ENCOUNTER (OUTPATIENT)
Facility: HOSPITAL | Age: 68
Setting detail: OUTPATIENT SURGERY
Discharge: HOME | End: 2023-09-27
Attending: ORTHOPAEDIC SURGERY | Admitting: ORTHOPAEDIC SURGERY
Payer: MEDICARE

## 2023-09-27 ENCOUNTER — ANESTHESIA (OUTPATIENT)
Dept: PERIOP | Facility: HOSPITAL | Age: 68
End: 2023-09-27
Payer: MEDICARE

## 2023-09-27 VITALS
BODY MASS INDEX: 29.53 KG/M2 | HEART RATE: 68 BPM | SYSTOLIC BLOOD PRESSURE: 152 MMHG | RESPIRATION RATE: 18 BRPM | HEIGHT: 64 IN | OXYGEN SATURATION: 98 % | TEMPERATURE: 97.1 F | WEIGHT: 173 LBS | DIASTOLIC BLOOD PRESSURE: 81 MMHG

## 2023-09-27 DIAGNOSIS — Z96.652 STATUS POST TOTAL KNEE REPLACEMENT, LEFT: Primary | ICD-10-CM

## 2023-09-27 LAB
ABO GROUP BLD: NORMAL
ABO GROUP BLD: NORMAL
BLD GP AB SCN SERPL QL: NEGATIVE
BLD GP AB SCN SERPL QL: NEGATIVE
RH BLD: NEGATIVE
RH BLD: NEGATIVE
SPECIMEN EXPIRATION DATE: NORMAL

## 2023-09-27 PROCEDURE — 86923 COMPATIBILITY TEST ELECTRIC: CPT

## 2023-09-27 PROCEDURE — C1776 JOINT DEVICE (IMPLANTABLE): HCPCS | Performed by: ORTHOPAEDIC SURGERY

## 2023-09-27 PROCEDURE — 27447 TOTAL KNEE ARTHROPLASTY: CPT | Performed by: PHYSICIAN ASSISTANT

## 2023-09-27 PROCEDURE — C1713 ANCHOR/SCREW BN/BN,TIS/BN: HCPCS | Performed by: ORTHOPAEDIC SURGERY

## 2023-09-27 PROCEDURE — 27447 TOTAL KNEE ARTHROPLASTY: CPT | Performed by: ORTHOPAEDIC SURGERY

## 2023-09-27 PROCEDURE — 97163 PT EVAL HIGH COMPLEX 45 MIN: CPT

## 2023-09-27 PROCEDURE — 97110 THERAPEUTIC EXERCISES: CPT

## 2023-09-27 PROCEDURE — 86901 BLOOD TYPING SEROLOGIC RH(D): CPT | Performed by: ORTHOPAEDIC SURGERY

## 2023-09-27 PROCEDURE — 97116 GAIT TRAINING THERAPY: CPT

## 2023-09-27 PROCEDURE — 86850 RBC ANTIBODY SCREEN: CPT | Performed by: ORTHOPAEDIC SURGERY

## 2023-09-27 PROCEDURE — 86900 BLOOD TYPING SEROLOGIC ABO: CPT | Performed by: ORTHOPAEDIC SURGERY

## 2023-09-27 DEVICE — SMARTSET HIGH PERFORMANCE MV MEDIUM VISCOSITY BONE CEMENT 40G
Type: IMPLANTABLE DEVICE | Site: KNEE | Status: FUNCTIONAL
Brand: SMARTSET

## 2023-09-27 DEVICE — ATTUNE PATELLA MEDIALIZED DOME 35MM CEMENTED AOX
Type: IMPLANTABLE DEVICE | Site: KNEE | Status: FUNCTIONAL
Brand: ATTUNE

## 2023-09-27 DEVICE — ATTUNE KNEE SYSTEM TIBIAL INSERT FIXED BEARING MEDIAL STABILIZED LEFT AOX 5, 8MM
Type: IMPLANTABLE DEVICE | Site: KNEE | Status: FUNCTIONAL
Brand: ATTUNE

## 2023-09-27 DEVICE — ATTUNE KNEE SYSTEM FEMORAL CRUCIATE RETAINING NARROW SIZE 5N LEFT CEMENTED
Type: IMPLANTABLE DEVICE | Site: KNEE | Status: FUNCTIONAL
Brand: ATTUNE

## 2023-09-27 DEVICE — ATTUNE KNEE SYSTEM TIBIAL BASE FIXED BEARING SIZE 5 CEMENTED
Type: IMPLANTABLE DEVICE | Site: KNEE | Status: FUNCTIONAL
Brand: ATTUNE

## 2023-09-27 RX ORDER — ONDANSETRON 2 MG/ML
4 INJECTION INTRAMUSCULAR; INTRAVENOUS ONCE AS NEEDED
Status: DISCONTINUED | OUTPATIENT
Start: 2023-09-27 | End: 2023-09-27 | Stop reason: HOSPADM

## 2023-09-27 RX ORDER — ASCORBIC ACID 500 MG
500 TABLET ORAL DAILY
Status: DISCONTINUED | OUTPATIENT
Start: 2023-09-27 | End: 2023-09-27 | Stop reason: HOSPADM

## 2023-09-27 RX ORDER — DEXAMETHASONE SODIUM PHOSPHATE 10 MG/ML
INJECTION, SOLUTION INTRAMUSCULAR; INTRAVENOUS AS NEEDED
Status: DISCONTINUED | OUTPATIENT
Start: 2023-09-27 | End: 2023-09-27

## 2023-09-27 RX ORDER — PREDNISOLONE ACETATE 10 MG/ML
1 SUSPENSION/ DROPS OPHTHALMIC ONCE
Status: DISCONTINUED | OUTPATIENT
Start: 2023-09-27 | End: 2023-09-27 | Stop reason: HOSPADM

## 2023-09-27 RX ORDER — SENNOSIDES 8.6 MG
1 TABLET ORAL DAILY
Status: DISCONTINUED | OUTPATIENT
Start: 2023-09-27 | End: 2023-09-27 | Stop reason: HOSPADM

## 2023-09-27 RX ORDER — ACETAMINOPHEN 500 MG
1000 TABLET ORAL EVERY 8 HOURS
Refills: 0
Start: 2023-09-27

## 2023-09-27 RX ORDER — SODIUM CHLORIDE 9 MG/ML
75 INJECTION, SOLUTION INTRAVENOUS CONTINUOUS
Status: DISCONTINUED | OUTPATIENT
Start: 2023-09-27 | End: 2023-09-27

## 2023-09-27 RX ORDER — DOCUSATE SODIUM 100 MG/1
100 CAPSULE, LIQUID FILLED ORAL 2 TIMES DAILY
Qty: 30 CAPSULE | Refills: 0 | Status: SHIPPED | OUTPATIENT
Start: 2023-09-27

## 2023-09-27 RX ORDER — ONDANSETRON 2 MG/ML
INJECTION INTRAMUSCULAR; INTRAVENOUS AS NEEDED
Status: DISCONTINUED | OUTPATIENT
Start: 2023-09-27 | End: 2023-09-27

## 2023-09-27 RX ORDER — CHLORHEXIDINE GLUCONATE 4 G/100ML
SOLUTION TOPICAL DAILY PRN
Status: DISCONTINUED | OUTPATIENT
Start: 2023-09-27 | End: 2023-09-27 | Stop reason: HOSPADM

## 2023-09-27 RX ORDER — SODIUM CHLORIDE, SODIUM LACTATE, POTASSIUM CHLORIDE, CALCIUM CHLORIDE 600; 310; 30; 20 MG/100ML; MG/100ML; MG/100ML; MG/100ML
125 INJECTION, SOLUTION INTRAVENOUS CONTINUOUS
Status: DISCONTINUED | OUTPATIENT
Start: 2023-09-27 | End: 2023-09-27

## 2023-09-27 RX ORDER — FOLIC ACID 1 MG/1
1 TABLET ORAL DAILY
Status: DISCONTINUED | OUTPATIENT
Start: 2023-09-27 | End: 2023-09-27 | Stop reason: HOSPADM

## 2023-09-27 RX ORDER — HYDROMORPHONE HCL/PF 1 MG/ML
0.5 SYRINGE (ML) INJECTION
Status: DISCONTINUED | OUTPATIENT
Start: 2023-09-27 | End: 2023-09-27 | Stop reason: HOSPADM

## 2023-09-27 RX ORDER — KETAMINE HCL IN NACL, ISO-OSM 100MG/10ML
SYRINGE (ML) INJECTION AS NEEDED
Status: DISCONTINUED | OUTPATIENT
Start: 2023-09-27 | End: 2023-09-27

## 2023-09-27 RX ORDER — ENOXAPARIN SODIUM 100 MG/ML
40 INJECTION SUBCUTANEOUS DAILY
Status: DISCONTINUED | OUTPATIENT
Start: 2023-09-27 | End: 2023-09-27 | Stop reason: HOSPADM

## 2023-09-27 RX ORDER — GLYCOPYRROLATE 0.2 MG/ML
INJECTION INTRAMUSCULAR; INTRAVENOUS AS NEEDED
Status: DISCONTINUED | OUTPATIENT
Start: 2023-09-27 | End: 2023-09-27

## 2023-09-27 RX ORDER — CEFAZOLIN SODIUM 2 G/50ML
2000 SOLUTION INTRAVENOUS ONCE
Status: COMPLETED | OUTPATIENT
Start: 2023-09-27 | End: 2023-09-27

## 2023-09-27 RX ORDER — SODIUM CHLORIDE, SODIUM LACTATE, POTASSIUM CHLORIDE, CALCIUM CHLORIDE 600; 310; 30; 20 MG/100ML; MG/100ML; MG/100ML; MG/100ML
INJECTION, SOLUTION INTRAVENOUS CONTINUOUS PRN
Status: DISCONTINUED | OUTPATIENT
Start: 2023-09-27 | End: 2023-09-27

## 2023-09-27 RX ORDER — CEFAZOLIN SODIUM 2 G/50ML
2000 SOLUTION INTRAVENOUS EVERY 8 HOURS
Status: DISCONTINUED | OUTPATIENT
Start: 2023-09-27 | End: 2023-09-27 | Stop reason: HOSPADM

## 2023-09-27 RX ORDER — BRIMONIDINE TARTRATE 2 MG/ML
1 SOLUTION/ DROPS OPHTHALMIC 2 TIMES DAILY
Status: DISCONTINUED | OUTPATIENT
Start: 2023-09-27 | End: 2023-09-27 | Stop reason: HOSPADM

## 2023-09-27 RX ORDER — OXYCODONE HYDROCHLORIDE 10 MG/1
10 TABLET ORAL EVERY 4 HOURS PRN
Qty: 42 TABLET | Refills: 0 | Status: SHIPPED | OUTPATIENT
Start: 2023-09-27

## 2023-09-27 RX ORDER — BUPIVACAINE HYDROCHLORIDE 5 MG/ML
INJECTION, SOLUTION EPIDURAL; INTRACAUDAL
Status: COMPLETED | OUTPATIENT
Start: 2023-09-27 | End: 2023-09-27

## 2023-09-27 RX ORDER — ACETAMINOPHEN 325 MG/1
975 TABLET ORAL EVERY 8 HOURS SCHEDULED
Status: DISCONTINUED | OUTPATIENT
Start: 2023-09-27 | End: 2023-09-27 | Stop reason: HOSPADM

## 2023-09-27 RX ORDER — MAGNESIUM HYDROXIDE 1200 MG/15ML
LIQUID ORAL AS NEEDED
Status: DISCONTINUED | OUTPATIENT
Start: 2023-09-27 | End: 2023-09-27 | Stop reason: HOSPADM

## 2023-09-27 RX ORDER — CEPHALEXIN 500 MG/1
500 CAPSULE ORAL EVERY 12 HOURS SCHEDULED
Qty: 2 CAPSULE | Refills: 0 | Status: SHIPPED | OUTPATIENT
Start: 2023-09-27 | End: 2023-09-28

## 2023-09-27 RX ORDER — FENTANYL CITRATE 50 UG/ML
INJECTION, SOLUTION INTRAMUSCULAR; INTRAVENOUS
Status: COMPLETED | OUTPATIENT
Start: 2023-09-27 | End: 2023-09-27

## 2023-09-27 RX ORDER — EPHEDRINE SULFATE 50 MG/ML
INJECTION INTRAVENOUS AS NEEDED
Status: DISCONTINUED | OUTPATIENT
Start: 2023-09-27 | End: 2023-09-27

## 2023-09-27 RX ORDER — SODIUM CHLORIDE 9 MG/ML
INJECTION, SOLUTION INTRAVENOUS AS NEEDED
Status: DISCONTINUED | OUTPATIENT
Start: 2023-09-27 | End: 2023-09-27 | Stop reason: HOSPADM

## 2023-09-27 RX ORDER — CELECOXIB 200 MG/1
200 CAPSULE ORAL DAILY
Qty: 30 CAPSULE | Refills: 1 | Status: SHIPPED | OUTPATIENT
Start: 2023-09-27

## 2023-09-27 RX ORDER — OXYCODONE HYDROCHLORIDE 10 MG/1
10 TABLET ORAL EVERY 4 HOURS PRN
Status: DISCONTINUED | OUTPATIENT
Start: 2023-09-27 | End: 2023-09-27 | Stop reason: HOSPADM

## 2023-09-27 RX ORDER — MIDAZOLAM HYDROCHLORIDE 2 MG/2ML
INJECTION, SOLUTION INTRAMUSCULAR; INTRAVENOUS
Status: COMPLETED | OUTPATIENT
Start: 2023-09-27 | End: 2023-09-27

## 2023-09-27 RX ORDER — BUPIVACAINE HYDROCHLORIDE 7.5 MG/ML
INJECTION, SOLUTION INTRASPINAL AS NEEDED
Status: DISCONTINUED | OUTPATIENT
Start: 2023-09-27 | End: 2023-09-27

## 2023-09-27 RX ORDER — HYDROMORPHONE HCL/PF 1 MG/ML
0.5 SYRINGE (ML) INJECTION EVERY 4 HOURS PRN
Status: DISCONTINUED | OUTPATIENT
Start: 2023-09-27 | End: 2023-09-27 | Stop reason: HOSPADM

## 2023-09-27 RX ORDER — CHLORHEXIDINE GLUCONATE ORAL RINSE 1.2 MG/ML
15 SOLUTION DENTAL ONCE
Status: COMPLETED | OUTPATIENT
Start: 2023-09-27 | End: 2023-09-27

## 2023-09-27 RX ORDER — GABAPENTIN 100 MG/1
100 CAPSULE ORAL 3 TIMES DAILY
Qty: 90 CAPSULE | Refills: 0 | Status: SHIPPED | OUTPATIENT
Start: 2023-09-27

## 2023-09-27 RX ORDER — PROMETHAZINE HYDROCHLORIDE 12.5 MG/1
12.5 TABLET ORAL EVERY 6 HOURS PRN
Qty: 12 TABLET | Refills: 0 | Status: SHIPPED | OUTPATIENT
Start: 2023-09-27

## 2023-09-27 RX ORDER — SODIUM CHLORIDE, SODIUM LACTATE, POTASSIUM CHLORIDE, CALCIUM CHLORIDE 600; 310; 30; 20 MG/100ML; MG/100ML; MG/100ML; MG/100ML
100 INJECTION, SOLUTION INTRAVENOUS CONTINUOUS
Status: DISCONTINUED | OUTPATIENT
Start: 2023-09-27 | End: 2023-09-27 | Stop reason: HOSPADM

## 2023-09-27 RX ORDER — GABAPENTIN 100 MG/1
100 CAPSULE ORAL EVERY 8 HOURS
Status: DISCONTINUED | OUTPATIENT
Start: 2023-09-27 | End: 2023-09-27 | Stop reason: HOSPADM

## 2023-09-27 RX ORDER — ASPIRIN 325 MG
325 TABLET ORAL 2 TIMES DAILY
Qty: 84 TABLET | Refills: 0 | Status: SHIPPED | OUTPATIENT
Start: 2023-09-27

## 2023-09-27 RX ORDER — PHENYLEPHRINE HCL IN 0.9% NACL 1 MG/10 ML
SYRINGE (ML) INTRAVENOUS AS NEEDED
Status: DISCONTINUED | OUTPATIENT
Start: 2023-09-27 | End: 2023-09-27

## 2023-09-27 RX ORDER — ACETAMINOPHEN 325 MG/1
975 TABLET ORAL ONCE
Status: COMPLETED | OUTPATIENT
Start: 2023-09-27 | End: 2023-09-27

## 2023-09-27 RX ORDER — BISACODYL 10 MG
10 SUPPOSITORY, RECTAL RECTAL DAILY PRN
Status: DISCONTINUED | OUTPATIENT
Start: 2023-09-27 | End: 2023-09-27 | Stop reason: HOSPADM

## 2023-09-27 RX ORDER — OXYCODONE HYDROCHLORIDE 5 MG/1
5 TABLET ORAL EVERY 4 HOURS PRN
Status: DISCONTINUED | OUTPATIENT
Start: 2023-09-27 | End: 2023-09-27 | Stop reason: HOSPADM

## 2023-09-27 RX ORDER — DOCUSATE SODIUM 100 MG/1
100 CAPSULE, LIQUID FILLED ORAL 2 TIMES DAILY
Status: DISCONTINUED | OUTPATIENT
Start: 2023-09-27 | End: 2023-09-27 | Stop reason: HOSPADM

## 2023-09-27 RX ORDER — PROPOFOL 10 MG/ML
INJECTION, EMULSION INTRAVENOUS CONTINUOUS PRN
Status: DISCONTINUED | OUTPATIENT
Start: 2023-09-27 | End: 2023-09-27

## 2023-09-27 RX ORDER — ONDANSETRON 2 MG/ML
4 INJECTION INTRAMUSCULAR; INTRAVENOUS EVERY 4 HOURS PRN
Status: DISCONTINUED | OUTPATIENT
Start: 2023-09-27 | End: 2023-09-27 | Stop reason: HOSPADM

## 2023-09-27 RX ORDER — LIDOCAINE HYDROCHLORIDE 10 MG/ML
INJECTION, SOLUTION EPIDURAL; INFILTRATION; INTRACAUDAL; PERINEURAL AS NEEDED
Status: DISCONTINUED | OUTPATIENT
Start: 2023-09-27 | End: 2023-09-27

## 2023-09-27 RX ORDER — CALCIUM CARBONATE 500 MG/1
1000 TABLET, CHEWABLE ORAL DAILY PRN
Status: DISCONTINUED | OUTPATIENT
Start: 2023-09-27 | End: 2023-09-27 | Stop reason: HOSPADM

## 2023-09-27 RX ORDER — TRANEXAMIC ACID 10 MG/ML
1000 INJECTION, SOLUTION INTRAVENOUS ONCE
Status: COMPLETED | OUTPATIENT
Start: 2023-09-27 | End: 2023-09-27

## 2023-09-27 RX ADMIN — MIDAZOLAM 2 MG: 1 INJECTION INTRAMUSCULAR; INTRAVENOUS at 07:12

## 2023-09-27 RX ADMIN — ACETAMINOPHEN 975 MG: 325 TABLET ORAL at 14:23

## 2023-09-27 RX ADMIN — CHLORHEXIDINE GLUCONATE 0.12% ORAL RINSE 15 ML: 1.2 LIQUID ORAL at 06:16

## 2023-09-27 RX ADMIN — DOCUSATE SODIUM 100 MG: 100 CAPSULE, LIQUID FILLED ORAL at 12:12

## 2023-09-27 RX ADMIN — Medication 100 MCG: at 08:40

## 2023-09-27 RX ADMIN — IRON SUCROSE 300 MG: 20 INJECTION, SOLUTION INTRAVENOUS at 12:08

## 2023-09-27 RX ADMIN — PROPOFOL 120 MCG/KG/MIN: 10 INJECTION, EMULSION INTRAVENOUS at 07:40

## 2023-09-27 RX ADMIN — Medication 100 MCG: at 08:31

## 2023-09-27 RX ADMIN — CEFAZOLIN SODIUM 2000 MG: 2 SOLUTION INTRAVENOUS at 07:35

## 2023-09-27 RX ADMIN — DEXAMETHASONE SODIUM PHOSPHATE 10 MG: 10 INJECTION, SOLUTION INTRAMUSCULAR; INTRAVENOUS at 07:43

## 2023-09-27 RX ADMIN — SODIUM CHLORIDE 75 ML/HR: 0.9 INJECTION, SOLUTION INTRAVENOUS at 06:19

## 2023-09-27 RX ADMIN — Medication 20 MG: at 07:43

## 2023-09-27 RX ADMIN — ONDANSETRON 4 MG: 2 INJECTION INTRAMUSCULAR; INTRAVENOUS at 14:21

## 2023-09-27 RX ADMIN — SODIUM CHLORIDE, SODIUM LACTATE, POTASSIUM CHLORIDE, AND CALCIUM CHLORIDE 100 ML/HR: .6; .31; .03; .02 INJECTION, SOLUTION INTRAVENOUS at 11:10

## 2023-09-27 RX ADMIN — OXYCODONE HYDROCHLORIDE AND ACETAMINOPHEN 500 MG: 500 TABLET ORAL at 12:11

## 2023-09-27 RX ADMIN — EPHEDRINE SULFATE 10 MG: 50 INJECTION INTRAVENOUS at 09:35

## 2023-09-27 RX ADMIN — SODIUM CHLORIDE, SODIUM LACTATE, POTASSIUM CHLORIDE, AND CALCIUM CHLORIDE: .6; .31; .03; .02 INJECTION, SOLUTION INTRAVENOUS at 08:22

## 2023-09-27 RX ADMIN — BUPIVACAINE HYDROCHLORIDE 20 ML: 5 INJECTION, SOLUTION EPIDURAL; INTRACAUDAL at 07:15

## 2023-09-27 RX ADMIN — SODIUM CHLORIDE, SODIUM LACTATE, POTASSIUM CHLORIDE, AND CALCIUM CHLORIDE: .6; .31; .03; .02 INJECTION, SOLUTION INTRAVENOUS at 09:39

## 2023-09-27 RX ADMIN — ACETAMINOPHEN 975 MG: 325 TABLET ORAL at 06:16

## 2023-09-27 RX ADMIN — Medication 30 MG: at 07:40

## 2023-09-27 RX ADMIN — Medication 100 MCG: at 09:10

## 2023-09-27 RX ADMIN — GABAPENTIN 100 MG: 100 CAPSULE ORAL at 12:12

## 2023-09-27 RX ADMIN — FENTANYL CITRATE 50 MCG: 50 INJECTION, SOLUTION INTRAMUSCULAR; INTRAVENOUS at 07:48

## 2023-09-27 RX ADMIN — Medication 100 MCG: at 08:19

## 2023-09-27 RX ADMIN — Medication 100 MCG: at 08:49

## 2023-09-27 RX ADMIN — Medication 100 MCG: at 08:26

## 2023-09-27 RX ADMIN — BUPIVACAINE HYDROCHLORIDE IN DEXTROSE 1.6 ML: 7.5 INJECTION, SOLUTION SUBARACHNOID at 07:36

## 2023-09-27 RX ADMIN — GLYCOPYRROLATE 0.2 MCG: 0.2 INJECTION, SOLUTION INTRAMUSCULAR; INTRAVENOUS at 07:48

## 2023-09-27 RX ADMIN — LIDOCAINE HYDROCHLORIDE 50 MG: 10 INJECTION, SOLUTION EPIDURAL; INFILTRATION; INTRACAUDAL; PERINEURAL at 07:40

## 2023-09-27 RX ADMIN — ONDANSETRON 4 MG: 2 INJECTION INTRAMUSCULAR; INTRAVENOUS at 10:00

## 2023-09-27 RX ADMIN — FOLIC ACID 1 MG: 1 TABLET ORAL at 12:12

## 2023-09-27 RX ADMIN — EPHEDRINE SULFATE 10 MG: 50 INJECTION INTRAVENOUS at 09:20

## 2023-09-27 RX ADMIN — CEFAZOLIN SODIUM 2000 MG: 2 SOLUTION INTRAVENOUS at 14:23

## 2023-09-27 RX ADMIN — TRANEXAMIC ACID 1000 MG: 10 INJECTION, SOLUTION INTRAVENOUS at 07:46

## 2023-09-27 RX ADMIN — FENTANYL CITRATE 50 MCG: 50 INJECTION, SOLUTION INTRAMUSCULAR; INTRAVENOUS at 07:12

## 2023-09-27 RX ADMIN — SENNOSIDES 8.6 MG: 8.6 TABLET, FILM COATED ORAL at 12:12

## 2023-09-27 NOTE — PLAN OF CARE
Problem: PAIN - ADULT  Goal: Verbalizes/displays adequate comfort level or baseline comfort level  Description: Interventions:  - Encourage patient to monitor pain and request assistance  - Assess pain using appropriate pain scale  - Administer analgesics based on type and severity of pain and evaluate response  - Implement non-pharmacological measures as appropriate and evaluate response  - Consider cultural and social influences on pain and pain management  - Notify physician/advanced practitioner if interventions unsuccessful or patient reports new pain  Outcome: Progressing     Problem: INFECTION - ADULT  Goal: Absence or prevention of progression during hospitalization  Description: INTERVENTIONS:  - Assess and monitor for signs and symptoms of infection  - Monitor lab/diagnostic results  - Monitor all insertion sites, i.e. indwelling lines, tubes, and drains  - Monitor endotracheal if appropriate and nasal secretions for changes in amount and color  - [place] appropriate cooling/warming therapies per order  - Administer medications as ordered  - Instruct and encourage patient and family to use good hand hygiene technique  - Identify and instruct in appropriate isolation precautions for identified infection/condition  Outcome: Progressing     Problem: SAFETY ADULT  Goal: Maintain or return to baseline ADL function  Description: INTERVENTIONS:  -  Assess patient's ability to carry out ADLs; assess patient's baseline for ADL function and identify physical deficits which impact ability to perform ADLs (bathing, care of mouth/teeth, toileting, grooming, dressing, etc.)  - Assess/evaluate cause of self-care deficits   - Assess range of motion  - Assess patient's mobility; develop plan if impaired  - Assess patient's need for assistive devices and provide as appropriate  - Encourage maximum independence but intervene and supervise when necessary  - Involve family in performance of ADLs  - Assess for home care needs following discharge   - Consider OT consult to assist with ADL evaluation and planning for discharge  - Provide patient education as appropriate  Outcome: Progressing     Problem: Knowledge Deficit  Goal: Patient/family/caregiver demonstrates understanding of disease process, treatment plan, medications, and discharge instructions  Description: Complete learning assessment and assess knowledge base.   Interventions:  - Provide teaching at level of understanding  - Provide teaching via preferred learning methods  Outcome: Progressing     Problem: DISCHARGE PLANNING  Goal: Discharge to home or other facility with appropriate resources  Description: INTERVENTIONS:  - Identify barriers to discharge w/patient and caregiver  - Arrange for needed discharge resources and transportation as appropriate  - Identify discharge learning needs (meds, wound care, etc.)  - Arrange for interpretive services to assist at discharge as needed  - Refer to Case Management Department for coordinating discharge planning if the patient needs post-hospital services based on physician/advanced practitioner order or complex needs related to functional status, cognitive ability, or social support system  Outcome: Progressing

## 2023-09-27 NOTE — PROGRESS NOTES
Discharge instructions given to pt. Pt verbalizes the understanding of discharge instructions. All belongings given to patient. Patient in no distress and no concerns at this time.  Family picked up patient

## 2023-09-27 NOTE — PHYSICAL THERAPY NOTE
Physical Therapy Evaluation    Patient's Name: Thalia Huertas [facility]    Admitting Diagnosis  Primary osteoarthritis of left knee [M17.12]    Problem List  Patient Active Problem List   Diagnosis    Nuclear sclerotic cataract of right eye    Primary osteoarthritis of left knee    Class 1 obesity due to excess calories without serious comorbidity with body mass index (BMI) of 30.0 to 30.9 in adult    Status post total knee replacement, left       Past Medical History  Past Medical History:   Diagnosis Date    Left knee pain        Past Surgical History  Past Surgical History:   Procedure Laterality Date    CATARACT EXTRACTION      CORNEAL TRANSPLANT      EYE SURGERY      KNEE SURGERY         Recent Imaging  No orders to display       Recent Vital Signs  Vitals:    09/27/23 1235 09/27/23 1305 09/27/23 1335 09/27/23 1435   BP: 154/86 147/79 146/88 152/81   BP Location: Left arm Left arm Left arm Left arm   Pulse: 64 78 78 68   Resp: 18 16 18 18   Temp: (!) 97 °F (36.1 °C) (!) 96.5 °F (35.8 °C) (!) 97 °F (36.1 °C) (!) 97.1 °F (36.2 °C)   TempSrc: Temporal Temporal Temporal Temporal   SpO2: 99% 98% 98% 98%   Weight:       Height:            09/27/23 1335   PT Last Visit   PT Visit Date 09/27/23   Note Type   Note type Evaluation   Pain Assessment   Pain Assessment Tool 0-10   Pain Score No Pain   Restrictions/Precautions   Weight Bearing Precautions Per Order No   Home Living   Type of [address] [place] [address] Two level;1/2 bath on main level;Stairs to enter with rails;Bed/bath upstairs; Able to live on main level with bedroom/bathroom   Bathroom Shower/Tub Walk-in shower   Bathroom Toilet Raised   Bathroom Equipment Grab bars in shower;Grab bars around toilet   Bathroom Accessibility Accessible via walker   Home Equipment Walker;Cane   Prior Function   Level of [place] Independent with ADLs; Independent with functional mobility; Independent with IADLS   Lives With [facility] in the last 6 months 0   Vocational Retired   General   Family/Caregiver Present No   Cognition   Overall Cognitive Status WFL   Arousal/Participation Alert   Orientation Level Oriented X4   Memory Within functional limits   Following Commands Follows all commands and directions without difficulty   RLE Assessment   RLE Assessment WFL   LLE Assessment   LLE Assessment   (3/5)   Coordination   Movements are Fluid and Coordinated 0   Coordination and Movement Description decreased coordination due to knee stiffness and nerve block   Sensation X   Light Touch   RLE Light Touch Grossly intact   LLE Light Touch Impaired   LLE Light Touch Comments decreased due to nerve block   Bed Mobility   Supine to Sit 6  Modified independent   Additional items Increased time required   Sit to Supine 6  Modified independent   Additional items Increased time required   Transfers   Sit to Stand 6  Modified independent   Additional items Increased time required   Stand to Sit 6  Modified independent   Additional items Increased time required   Stand pivot 6  Modified independent   Additional items Increased time required   Ambulation/Elevation   Gait pattern Step through pattern;Decreased toe off;Decreased heel strike;Decreased foot clearance; Improper Weight shift; Excessively slow   Gait Assistance 6  Modified independent   Additional items Verbal cues   Assistive Device Rolling walker   Distance 200ft   Stair Management Assistance 5  Supervision   Additional items Assist x 1;Verbal cues; Tactile cues; Increased time required   Stair Management Technique Step to pattern; Foreward; No rails; With cane   Number of Stairs 6   Balance   Static Sitting Fair +   Dynamic Sitting Fair +   Static Standing Fair   Dynamic Standing Fair -   Ambulatory Fair -   Endurance Deficit   Endurance Deficit Yes   Endurance Deficit Description post op fatigue   Activity Tolerance   Activity Tolerance Patient tolerated treatment well   Medical Staff Made Aware spoke to [facility]   Nurse Made Aware spoke to RN   Assessment   Prognosis Good   Problem List Decreased strength;Decreased endurance; Impaired balance;Decreased mobility; Decreased coordination;Decreased range of motion; Impaired sensation   Barriers to Discharge Inaccessible home environment;Decreased caregiver support   Goals   Patient Goals to return home   Recommendation   PT Discharge Recommendation Home with outpatient rehabilitation   Equipment Recommended [address] walker   AM-PAC Basic Mobility Inpatient   Turning in Flat Bed Without Bedrails 4   Lying on Back to Sitting on Edge of Flat Bed Without Bedrails 4   Moving Bed to Chair 4   Standing Up From Chair Using Arms 4   Walk in Room 4   Climb 3-5 Stairs With Railing 3   Basic Mobility Inpatient Raw Score 23   Basic Mobility Standardized Score 50.88   Highest Level Of Mobility   JH-HLM Goal 7: Walk 25 feet or more   JH-HLM Achieved 7: Walk 25 feet or more   Additional Treatment Session   Start Time 1350   End Time 1415   Treatment Assessment Pt participated with additional gait and stairs training as well as education on HEP including quad sets and heel slides. She had no increased pain with activity, Sister is present during treatment and confirms all instructions and education. Equipment Use [facility]   Exercises   Quad Sets 10 reps; Sitting;AROM   Heelslides Sitting;10 reps;AROM   End of Consult   Patient Position at End of Consult Seated edge of bed; All needs within reach         [address] D[address] Alexander is a 75 y.o. female admitted to [facility] on 9/27/2023 for Status post total knee replacement, left. Pt  has a past medical history of Left knee pain. Osiel Speaker PT was consulted and pt was seen on 9/27/2023 for mobility assessment and d/c planning.    Impairments limiting pt at this time include decreased ROM, impaired balance, decreased endurance, decreased coordination, new onset of impairment of functional mobility, decreased sensation, and decreased strength. Pt is currently functioning at a modified independent assistance level for bed mobility, modified independent assistance level for transfers, modified independent assistance level for ambulation with Rolling Walker, and supervision assistance x1 for elevations. The patient's AM-[facility] Basic Mobility Inpatient Short Form Raw Score is 23. A Raw score of greater than 16 suggests the patient may benefit from discharge to home. Please also refer to the recommendation of the Physical Therapist for safe discharge planning.     Recommendations                                                                                                                DME: Rolling Walker    Discharge Disposition:  Home with Outpatient Physical Therapy       Jenny Ziegler PT, DPT

## 2023-09-27 NOTE — INTERVAL H&P NOTE
H&P reviewed. After examining the patient I find no changes in the patients condition since the H&P had been written.   Heart:  regular rate  Lungs:  no audible wheezing  Abd:  nondistended  LLE:  EHL/AT/GS intact, sensation grossly intact L4, L5, S1, palpable pedal pulse    Vitals:    09/27/23 0547   BP: (!) 178/80   Pulse: 82   Resp: 18   Temp: (!) 96.2 °F (35.7 °C)   SpO2: 97%

## 2023-09-27 NOTE — ANESTHESIA PROCEDURE NOTES
Spinal Block    Patient location during procedure: OR  Start time: 9/27/2023 7:36 AM  Reason for block: primary anesthetic  Staffing  Performed by: Diaz Monge CRNA  Authorized by: Chris Johnson MD    Preanesthetic Checklist  Completed: patient identified, IV checked, site marked, risks and benefits discussed, surgical consent, monitors and equipment checked, pre-op evaluation and timeout performed  Spinal Block  Patient position: sitting  Prep: ChloraPrep  Patient monitoring: heart rate, continuous pulse ox and frequent blood pressure checks  Approach: midline  Location: L3-4  Injection technique: single-shot  Needle  Needle type: pencil-tip   Needle gauge: 25 G  Needle length: 10 cm  Assessment  Sensory level: T4  Events: cerebrospinal fluid  Injection Assessment:  negative aspiration for heme, positive aspiration for clear CSF and no paresthesia on injection.   Post-procedure:  site cleaned

## 2023-09-27 NOTE — DISCHARGE INSTR - AVS FIRST PAGE
TOTAL KNEE REPLACEMENT DISCHARGE INSTRUCTIONS    Surgical Dressing: You may leave your dressing in place for 1 week. If it starts lifting off you can change it prior to that starting 2 days after your surgery. Once you start changing your dressing you should change your dressing daily until drainage stops. Do not remove your steri-strips. Do not put any lotions or creams on your incision. If there are any signs of infection such as drainage persisting beyond 7 days, unusual looking drainage (yellow, green), increased redness around the incision, or fever/chills let your doctor know. Do not get your incision wet. Continue to wear your ROSSANA stockings 2 weeks after surgery. You can remove at night them to wash, hang to dry, and reapply in the morning after sponge bath or shower (do not get your incision wet). You can wear them as needed after that. If the stockings are too tight at the top you can cut the elastic. Medications:  Upon discharge you will be given a prescription for an anticoagulant (i.e. Ecotrin (Aspirin), Coumadin (Warfarin), Lovenox (Enoxaparin)). Take as prescribed. Do not take any over the counter NSAIDs (i.e. Ibuprofen, Motrin, Advil, Naprosyn, Aleve) while on your anticoagulation medication unless otherwise specified by your surgeon. You will also be given Gabapentin if appropriate and a narcotic pain reliever for pain. Please be mindful of the number of pills you have left. You can only get a refill Monday-Friday during business hours from your surgeon or the physician assistant. You will not be given any refills on nights and weekends. Call ahead if needed. Please include tylenol in your pain regimen as well. You will also be given Colace to prevent constipation that can be caused by narcotics. Please include other over- the- counter medications for constipation if needed.      If you are on Coumadin (Warfarin) you will need your blood drawn every Monday and Thursday once you are home. Initially your visiting nurse will draw your blood. Later you will go to an outpatient lab. You will receive a phone call the next day and your Coumadin (warfarin) will be dosed based on these results. Take this dosage daily until you hear from us next. Walking:  Use two crutches or a walker for EVERY step. Gradually increase your walking daily. You can progress to a cane as tolerated once advised by your physical therapist.  Be sure to work on advancing your range of motion daily. Bathing:  Do not get your incision wet until follow up in the office. No tub baths. Do not submerge your incision. You may shower but you must cover your incision so it does NOT get wet. Gauze and Tegaderm dressing can be used to cover your incision. Once your original dressing comes off. These can be found at the drug store. Use your crutches/walker to get in and out of the shower. Use a chair if needed. Sexual Relations:  Resume according to your comfort. Swimming:  No swimming or hot tubs until approved by your physician. Swimming will be allowed once your incision is well healed. Driving: You may ride as a passenger now. No driving until your follow-up appointment. To get into the car use the front passenger seat with the seat pushed back as far as possible. Scoot yourself back in the seat. Use your hands to assist your legs into the car. Physical therapy:  Continue with exercises at home. Plan on going to outpatient physical therapy within a couple days of your surgery once you are home. If you are doing home physical therapy please call the office for a prescription once you are ready to transition to outpatient physical therapy. Special considerations: To minimize swelling, stiffness, and decrease pain use cold as needed, but not heat. Ice 20 minutes at a time with a cloth between your skin and the ice.   Ice after walking, when you have pain, or after you have completed your exercises. Limit your sitting to 60 minutes at one time. Continue to wear your ROSSANA stockings 2 weeks after surgery. You can remove at night them to wash, hang to dry, and reapply in the morning after sponge bath or shower (do not get your incision wet). You can wear them as needed after that. If the stockings are too tight at the top you can cut the elastic. Follow up:  Call [phone] to make an appointment to see your surgeon within 2 weeks of your surgery if you haven’t done so already. If you have any questions please call [phone] for any concerns as well. For the future:  Antibiotics for dental appointments is controversial.  If you have multiple medical problems we would recommend for you to call the office for a prescription for antibiotics to be taken one hour prior to your dental appointment. If you do not have multiple medical problems it is your choice if you would like to take antibiotics prior to dental appointments. We would also recommend you talking to your dentist prior to your appointment as well. For any invasive procedures (i.e. endoscopy, colonoscopy, etc.) inform the doctor performing the procedure that you have a total knee replacement and they will give you antibiotics just prior to the procedure.

## 2023-09-27 NOTE — ANESTHESIA PROCEDURE NOTES
Peripheral Block    Patient location during procedure: holding area  Start time: 9/27/2023 7:12 AM  Reason for block: at surgeon's request and post-op pain management  Staffing  Performed by: Shay Melara MD  Authorized by: Shay Melara MD    Preanesthetic Checklist  Completed: patient identified, IV checked, site marked, risks and benefits discussed, surgical consent, monitors and equipment checked, pre-op evaluation and timeout performed  Peripheral Block  Patient position: supine  Prep: ChloraPrep  Patient monitoring: continuous pulse oximetry, frequent blood pressure checks and heart rate  Block type: Adductor Canal  Laterality: left  Injection technique: single-shot  Procedures: ultrasound guided, Ultrasound guidance required for the procedure to increase accuracy and safety of medication placement and decrease risk of complications.   Ultrasound permanent image savedbupivacaine (PF) (MARCAINE) 0.5 % injection 20 mL - Perineural   20 mL - 9/27/2023 7:15:00 AM  fentanyl citrate (PF) 100 MCG/2ML 50 mcg - Intravenous   50 mcg - 9/27/2023 7:12:00 AM  midazolam (VERSED) injection 0.5 mg - Intravenous   2 mg - 9/27/2023 7:12:00 AM  Needle  Needle type: Stimuplex   Needle gauge: 20 G  Needle length: 6 in  Needle localization: ultrasound guidance  Needle insertion depth: 5 cm  Assessment  Injection assessment: injected with ease, negative aspiration, incremental injection, needle tip visualized at all times and negative for heart rate change  Paresthesia pain: immediately resolved  Post-procedure:  site cleaned  patient tolerated the procedure well with no immediate complications

## 2023-09-27 NOTE — ANESTHESIA POSTPROCEDURE EVALUATION
Post-Op Assessment Note    CV Status:  Stable  Pain Score: 0    Pain management: adequate     Mental Status:  Alert and awake   Hydration Status:  Euvolemic   PONV Controlled:  Controlled   Airway Patency:  Patent      Post Op Vitals Reviewed: Yes      Staff: CRNA         No notable events documented.     /57 (09/27/23 1004)    Temp (!) 96.8 °F (36 °C) (09/27/23 1004)    Pulse 86 (09/27/23 1004)   Resp 20 (09/27/23 1004)    SpO2 100 % (09/27/23 1004)

## 2023-09-27 NOTE — OP NOTE
OPERATIVE REPORT  PATIENT NAME: Jurgen Carney  : 1955  MRN: [id number]  Pt Location:  [address] [place] [address] OR ROOM 12    Surgery Date: 2023    Surgeon(s) and Role:     * Bard Church, DO - Primary     * Mikaela Moreno PA-C - Assisting      * Delfino Luna ATC - Assisting    Preop Diagnosis:  Primary osteoarthritis of left knee [M17.12]    Post-Op Diagnosis Codes:     * Primary osteoarthritis of left knee [M17.12]    Procedure(s):  Left - ARTHROPLASTY KNEE TOTAL    Specimens:  * No specimens in log *    Estimated Blood Loss:   Minimal    Drains:  * No LDAs found *    Anesthesia Type:   Spinal     Operative Indications:  Primary osteoarthritis of left knee [M17.12]    Operative Findings:  See below    Complications:   None      Knee Approach: Medial Parapatellar    Procedure and Technique:  Implants:  Depuy ClarityAdune  CR femoral component size 5 narrow  Tibial base fixed bearing size 5  Tibial insert fixed bearing MS size 5, 8mm  Dome patella size 35mm    INDICATIONS FOR PROCEDURE:  The patients is a 75 y.o. female who presented to the office with  knee OA. Conservative treatment was attempted for quite some time and ultimately failed. She has severe progressive pain, stiffness, and disability and now elects to proceed with left total knee replacement surgery. Extensive counseling in regards to the reasons for surgical intervention as well as the risks and benefits of surgery were reviewed. The risks include, but are not limited to infection, extensive blood loss, blood clots, wound healing problems, fracture, need for additional surgery, need for revision surgery, failure of hardware, persistent pain and stiffness, heart attack, stroke, death. The patient understood and agreed to by oral and written consent.     OPERATIVE PROCEDURE:  The patient was identified as Jurgen Carney by Her ID bracelet by the surgical staff in the preoperative area at [facility].  The patient was wheeled back to the surgical room and placed on the operative table. Anesthesia was administered. Preoperative antibiotics were given. The patient remained in the supine position and all bony prominences were carefully protected. A tourniquet was applied to the patient’s left upper thigh. The left leg was then prepped and draped in the usual sterile fashion. A timeout was performed where the patient’s name and surgical site were once again identified. The incision was marked out. The leg was elevated and the tourniquet was inflated to 250 mmHg. An incision was made over anterior aspect of the knee. Dissection was made through the skin and subcutaneous tissue. A medial parapatellar arthrotomy was made and the medial tissues were elevated while protecting the MCL. Remnants of the ACL, medial and lateral menisci were removed and retractors were placed. Severe osteoarthritis was noted. The femoral canal was opened with a drill and the contents were suctioned and the canal was irrigated. We used an intramedullary guide on the femoral side and resected 9mm of distal femur in 5 degrees of valgus. Osteophytes were removed. We then subluxated the tibia forward and opened the tibial canal with a drill. The contents of the canal were then suctioned and the canal was irrigated. We placed an intramedullary guide and resected 8mm of bone based on the highest point of the lateral tibial plateau perpendicular to the mechanical axis of the tibia. Next, the flexion and extension gaps were analyzed with a spacer block and visualization. Boulevard’s line and the epicondylar axis were then identified. The knee appeared tight in flexion and extension. We then resected 2mm more off the tibia. The 4 in 1 cutting block was placed in 3 degrees of external rotation and resections were made. Posterior osteophytes and any remnants of the medial and lateral menisci were removed.   Trials were then placed and the knee was felt to be stable and well balanced throughout the arc of motion. The patella was measured and a portion of the articular aspect of the patella was removed. The trial for the patella was placed and patellar tracking was checked and found to be adequate with the other components in place. Attention was directed back to the tibia. External rotation of the tibial guide was set and the final preparations of the tibia were performed. The components were removed and the knee was copiously irrigated with pulse lavage and then dried. The components were then cemented in place and excess cement was removed. Once the cement was dried the trial insert was trialed. A size 5, 8mm tibial insert was confirmed to be the correct size. The final polyethylene component was placed and the tourniquet was let down. Any bleeders were cauterized and then the knee was irrigated. An irrisept wash was performed and then the knee was once again copiously irrigated. Marcaine, morphine, and toradol was injected periarticularly throughout the case. The arthrotomy was closed with vicryl suture. The skin and subcutaneous tissues were closed with vicryl and then a running monocryl stitch. A sterile dressing was placed. The patient was awakened and transferred to a hospital bed and then to the recovery room in stable condition. I attest that I was present and performed this procedure. Janene Carter PA-C and Delfino Luna ATC were present for the entire procedure and provided essential assistance with limb position, patient prepping, and retraction. A qualified resident physician was not available.     Patient Disposition:  hemodynamically stable            SIGNATURE: Bard Church,   DATE: September 27, 2023  TIME: 9:49 AM

## 2023-09-28 ENCOUNTER — TELEPHONE (OUTPATIENT)
Dept: OBGYN CLINIC | Facility: HOSPITAL | Age: 68
End: 2023-09-28

## 2023-09-28 LAB
ABO GROUP BLD BPU: NORMAL
ABO GROUP BLD BPU: NORMAL
BPU ID: NORMAL
BPU ID: NORMAL
CROSSMATCH: NORMAL
CROSSMATCH: NORMAL
UNIT DISPENSE STATUS: NORMAL
UNIT DISPENSE STATUS: NORMAL
UNIT PRODUCT CODE: NORMAL
UNIT PRODUCT CODE: NORMAL
UNIT PRODUCT VOLUME: 300 ML
UNIT PRODUCT VOLUME: 300 ML
UNIT RH: NORMAL
UNIT RH: NORMAL

## 2023-09-29 NOTE — TELEPHONE ENCOUNTER
Patient contacted for a postoperative follow up assessment. Patient reports "walking all over the house yesterday" with the RW, and "maybe over did it." She reports a current level of "0/10" with rest, with movement increasing to a 4/10 pain. Patient denies increase in swelling, stating it is the "same" and dressing is clean, dry and intact. Patient is icing the site regularly. We reviewed her AVS instructions for surgical care, and ROSSANA stockings. She has outpatient PT on Monday. We reviewed patients AVS medication list. Patient is taking Tylenol 1000mg every 8 hours, Celebrex 200mg daily, Gabapentin 100mg TID, Oxycodone 5mg (cutting the 10mg tablet in half) every 4 hours, ASA 325mg BID, Keflex 500mg BID, Colace 100mg BID (discussed increasing fluid and fiber, and OTC miralax if needed). Patient has not yet had a BM. Her sister also confirmed she is taking Phenergan, BID. Patient denies nausea, vomiting, abdominal pain, chest pain, shortness of breath, fever, and calf pain. She does reports some dizziness with administration of the Oxycodone. Patient does not have any other questions or concerns at this time. Pt was encouraged to call with any questions, concerns or issues.

## 2023-10-02 ENCOUNTER — EVALUATION (OUTPATIENT)
Dept: PHYSICAL THERAPY | Facility: REHABILITATION | Age: 68
End: 2023-10-02
Payer: MEDICARE

## 2023-10-02 DIAGNOSIS — Z96.652 STATUS POST TOTAL KNEE REPLACEMENT, LEFT: ICD-10-CM

## 2023-10-02 DIAGNOSIS — M25.562 ACUTE PAIN OF LEFT KNEE: Primary | ICD-10-CM

## 2023-10-02 PROCEDURE — 97140 MANUAL THERAPY 1/> REGIONS: CPT | Performed by: PHYSICAL THERAPIST

## 2023-10-02 PROCEDURE — 97161 PT EVAL LOW COMPLEX 20 MIN: CPT | Performed by: PHYSICAL THERAPIST

## 2023-10-02 NOTE — PROGRESS NOTES
PT Evaluation     Today's date: 10/2/2023  Patient name: Jurgen Carney  : 1955  MRN: [id number]  Referring provider: Bard Church  Dx:   Encounter Diagnosis     ICD-10-CM    1. Acute pain of left knee  M25.562       2. Status post total knee replacement, left  Z96.652 Ambulatory referral to Physical Therapy                     Assessment  Assessment details: Jurgen Carney is a 75 y.o. female referred to outpt PT s/p left TKA. Preston Baumann Pt presents with decrease in left knee ROM, decrease in left quad activation and function, and positive edema and pain. Pt funct is limited with decrease in tolerance to ADL's of dressing/bathing, decrease in ambulation tolerance, and decrease in negotiation of stairs. Pt would benefit from skilled PT to address these limitations and max funct. Impairments: abnormal or restricted ROM, impaired physical strength, lacks appropriate home exercise program and pain with function    Goals  Funct 1. Increase ambulation with ' x4 weeks. 2. Improve stair negotiation non reciprocally x1 flight x4 weeks. Impairment 1. Increase ROM by 25% x4 weeks  2. Decrease pain by 25 % x4 weeks  3. Increase strength by 1/2 grade x4 weeks. Plan  Patient would benefit from: skilled physical therapy  Planned therapy interventions: home exercise program, neuromuscular re-education, therapeutic exercise, stretching, strengthening and manual therapy  Frequency: 2x week  Duration in weeks: 4        Subjective Evaluation    History of Present Illness  Mechanism of injury: Pt has left TKA performed on 23 and was DC'd to home the same day. Pt reports that she is controlling her pain with use of medication (1/2 tablet) every few hours. Pt currently using RW to ambulate and notes "doing well" with use, which she is up and moving every hour. Pt is sleeping well with min pain and restriction, using medication to assist with her sx's.   Pt has 1 flight of stairs to bedroom and bathroom, does note having higher toilet seats to assist with sit to stand transfers and is currently sitting to perform stair negotiation. Pt has f/u with ortho next Wednesday 10/11/23 and was educated to remove dressing independently this Wednesday at home. Patient Goals  Patient goals for therapy: decreased pain, independence with ADLs/IADLs and increased motion          Objective     Active Range of Motion   Left Knee   Flexion: 80 degrees   Extension: -30 degrees     Additional Active Range of Motion Details  Pt has dressing intact and positive ecchymosis and edema surrounding left knee and into gastroc. Negative tenderness along deep venous system and no signs concerning of infection or DVT. Passive Range of Motion   Left Knee   Flexion: 93 degrees   Extension: -25 degrees     Strength/Myotome Testing     Left Knee   Quadriceps contraction: fair    Additional Strength Details  Pt compensates with QS using left glute to perform hip extension.               Precautions: None        Manual  10/2/23        PROM L knee flex/ext  10 mins             grade I post tibial glides                                                             Neuro Re-Ed             HR/TR          Stand hip abd                                                                           Therex            Rec bike            QS 5"x10             supine heel slides 5"x10             seated heel slides hep            SAQ              SLR                                                        Gait Training                                 Modalities

## 2023-10-05 ENCOUNTER — APPOINTMENT (OUTPATIENT)
Dept: PHYSICAL THERAPY | Facility: REHABILITATION | Age: 68
End: 2023-10-05
Payer: MEDICARE

## 2023-10-09 ENCOUNTER — OFFICE VISIT (OUTPATIENT)
Dept: PHYSICAL THERAPY | Facility: REHABILITATION | Age: 68
End: 2023-10-09
Payer: MEDICARE

## 2023-10-09 DIAGNOSIS — Z96.652 STATUS POST TOTAL KNEE REPLACEMENT, LEFT: ICD-10-CM

## 2023-10-09 DIAGNOSIS — M25.562 ACUTE PAIN OF LEFT KNEE: Primary | ICD-10-CM

## 2023-10-09 PROCEDURE — 97112 NEUROMUSCULAR REEDUCATION: CPT | Performed by: PHYSICAL THERAPIST

## 2023-10-09 PROCEDURE — 97140 MANUAL THERAPY 1/> REGIONS: CPT | Performed by: PHYSICAL THERAPIST

## 2023-10-09 PROCEDURE — 97110 THERAPEUTIC EXERCISES: CPT | Performed by: PHYSICAL THERAPIST

## 2023-10-09 NOTE — PROGRESS NOTES
Daily Note     Today's date: 10/9/2023  Patient name: Lisseth Adames  : 1955  MRN: [id number]  Referring provider: Sheree Valadez  Dx:   Encounter Diagnosis     ICD-10-CM    1. Acute pain of left knee  M25.562       2. Status post total knee replacement, left  Z96.652                      Subjective: Pt reports that she is doing well with home exercises. Pt is able to ambulate with use of RW frequently throughout her day and is having min pain and sx's. Pt does note calf pain and is not using TEDS due to increase in pain with use. Objective: See treatment diary below      Precautions: None        Manual  10/2/23 10/9/23       PROM L knee flex/ext  10 mins   10 mins           grade I post tibial glides                                                             Neuro Re-Ed             HR/TR   3"x15 ea       Stand hip abd           Stand alt march   3"x10 ea                                                            Therex            Rec bike    5 mins aarom        QS 5"x10  5"2x10          supine heel slides 5"x10  Strap 5"x10           seated heel slides hep            SAQ              SLR              LAQ   3"x10                                       Gait Training                                 Modalities                                                      Assessment: Pt has steri-strips intact and good healing at incision. Pt has negative concerns of DVT with min tenderness, no redness or warmth, but does have ecchymosis throughout left LE. Initiated treatment on rec bike for aarom with good tolerance and progression of stand/WB exercises without increase in pain. Pt is achieving good knee ROM progression since IE with extension to -10 while at rest, and improvement in flexion to beyond 90 degrees. Pt does compensate QS with activation of glutes, encouraged to elevate heel during QS to assist with appropriate quad contraction.   Pt issued updated HEP and has f/u with ortho scheduled for Wednesday 10/11/23. Plan: Continue per plan of care.

## 2023-10-11 ENCOUNTER — APPOINTMENT (OUTPATIENT)
Dept: RADIOLOGY | Facility: MEDICAL CENTER | Age: 68
End: 2023-10-11
Payer: MEDICARE

## 2023-10-11 ENCOUNTER — OFFICE VISIT (OUTPATIENT)
Dept: OBGYN CLINIC | Facility: MEDICAL CENTER | Age: 68
End: 2023-10-11

## 2023-10-11 DIAGNOSIS — Z96.652 STATUS POST TOTAL KNEE REPLACEMENT, LEFT: ICD-10-CM

## 2023-10-11 DIAGNOSIS — Z96.652 STATUS POST TOTAL KNEE REPLACEMENT, LEFT: Primary | ICD-10-CM

## 2023-10-11 PROCEDURE — 99024 POSTOP FOLLOW-UP VISIT: CPT | Performed by: ORTHOPAEDIC SURGERY

## 2023-10-11 PROCEDURE — 73562 X-RAY EXAM OF KNEE 3: CPT

## 2023-10-11 RX ORDER — NALOXONE HYDROCHLORIDE 4 MG/.1ML
SPRAY NASAL
Qty: 1 EACH | Refills: 1 | Status: SHIPPED | OUTPATIENT
Start: 2023-10-11 | End: 2024-10-10

## 2023-10-11 RX ORDER — OXYCODONE HYDROCHLORIDE 10 MG/1
10 TABLET ORAL EVERY 6 HOURS PRN
Qty: 28 TABLET | Refills: 0 | Status: SHIPPED | OUTPATIENT
Start: 2023-10-11

## 2023-10-11 NOTE — PROGRESS NOTES
Assessment/Plan:  1. Status post total knee replacement, left      Orders Placed This Encounter   Procedures    XR knee 3 vw left non injury       Patient is doing very well 2 weeks status post left TKA on 9/27/23. Continue outpatient PT. Pain control prn- oxycodone, gabapentin, celebrex, and tylenol. Refill provided for oxycodone. Patient aware to wean away from opioids. Continue with  BID for DVT prophylaxis  ROSSANA stockings as needed for swelling  May shower and let water run over incision, do not submerge incision  Patient should call ahead for abx prior to dental appts. Return in about 4 weeks (around 11/8/2023) for L TKA post op 2. I answered all of the patient's questions during the visit and provided education of the patient's condition during the visit. The patient verbalized understanding of the information given and agrees with the plan. This note was dictated using i[employer] software. It may contain errors including improperly dictated words. Please contact physician directly for any questions. Subjective   Chief Complaint:   Chief Complaint   Patient presents with    Left Knee - Post-op       Karen Haywood is a 75 y.o. female who presents for 2 week  follow up s/p left TKA. Patient reports she is doing well. She is taking oxycodone 5 mg every 4 hours, celebrex, gabapentin, and tylenol. She notes her pain is overall controlled. Patient is taking ASA DVT ppx. Patient is attending outpatient PT. She is using a rolling walker for assistance ambulating. Patient notes she had to stop wearing her ROSSANA stockings due to pain and discomfort in the leg. She has been icing and elevating. Denies fevers or chills. Review of Systems  ROS:    See HPI for musculoskeletal review.    All other systems reviewed are negative     History:  Past Medical History:   Diagnosis Date    Left knee pain      Past Surgical History:   Procedure Laterality Date    CATARACT EXTRACTION      CORNEAL TRANSPLANT      EYE SURGERY      KNEE SURGERY      [place] ARTHRP KNE CONDYLE&PLATU MEDIAL&LAT COMPARTMENTS Left 9/27/2023    Procedure: ARTHROPLASTY KNEE TOTAL;  Surgeon: Shania Crook DO;  Location: [address] [place] [address] OR;  Service: Orthopedics     Social History   Social History     Substance and Sexual Activity   Alcohol Use Not Currently    Comment: 3 x year     Social History     Substance and Sexual Activity   Drug Use Never     Social History     Tobacco Use   Smoking Status Never   Smokeless Tobacco Never     Family History: No family history on file. Current Outpatient Medications on File Prior to Visit   Medication Sig Dispense Refill    acetaminophen (TYLENOL) 500 mg tablet Take 2 tablets (1,000 mg total) by mouth every 8 (eight) hours  0    Alphagan P 0.1 % INSTILL ONE DROP INTO THE RIGHT EYE EVERY 12 HOURS      ascorbic acid (VITAMIN C) 500 MG tablet Take 1 tablet (500 mg total) by mouth daily Begin 30 days prior to surgery 30 tablet 1    aspirin 325 mg tablet Take 1 tablet (325 mg total) by mouth 2 (two) times a day Take with food. 84 tablet 0    betamethasone, augmented, (DIPROLENE) 0.05 % ointment       celecoxib (CeleBREX) 200 mg capsule Take 1 capsule (200 mg total) by mouth daily Take with food. Do not take at the same time as aspirin.  30 capsule 1    docusate sodium (COLACE) 100 mg capsule Take 1 capsule (100 mg total) by mouth 2 (two) times a day 30 capsule 0    ferrous sulfate 324 (65 Fe) mg Take 1 tablet (324 mg total) by mouth in the morning Begin 30 days prior to surgery 30 tablet 1    folic acid (KP Folic Acid) 1 mg tablet Take 1 tablet (1 mg total) by mouth daily Begin 30 days prior to surgery 30 tablet 1    gabapentin (NEURONTIN) 100 mg capsule Take 1 capsule (100 mg total) by mouth 3 (three) times a day 90 capsule 0    Multiple Vitamin (multivitamin) tablet Take 1 tablet by mouth daily Begin 30 days prior to surgery 30 tablet 1    prednisoLONE acetate (PRED FORTE) 1 % ophthalmic suspension 1 drop once      promethazine (PHENERGAN) 12.5 MG tablet Take 1 tablet (12.5 mg total) by mouth every 6 (six) hours as needed for nausea or vomiting 12 tablet 0    Tafluprost, PF, 0.0015 % SOLN       [DISCONTINUED] oxyCODONE (ROXICODONE) 10 MG TABS Take 1 tablet (10 mg total) by mouth every 4 (four) hours as needed for severe pain Max Daily Amount: 60 mg 42 tablet 0     No current facility-administered medications on file prior to visit. Allergies   Allergen Reactions    Fioricet [Butalbital-Apap-Caffeine] Rash     "red from the neck up"    Wound Dressing Adhesive Rash        Objective     There were no vitals taken for this visit.      PE:  AAOx 3  WDWN  Hearing intact, no drainage from eyes  no audible wheezing  no abdominal distension  LE compartments soft, AT/GS intact    Ortho Exam:  left Knee:   INC: C/D/I, No erythema, mild swelling  AROM: 5 - 100 degrees    Imaging Studies: I have personally reviewed pertinent films in PACS  XR left knee:  S/p TKA, adequate alignment

## 2023-10-16 ENCOUNTER — OFFICE VISIT (OUTPATIENT)
Dept: PHYSICAL THERAPY | Facility: REHABILITATION | Age: 68
End: 2023-10-16
Payer: MEDICARE

## 2023-10-16 DIAGNOSIS — M25.562 ACUTE PAIN OF LEFT KNEE: Primary | ICD-10-CM

## 2023-10-16 DIAGNOSIS — Z96.652 STATUS POST TOTAL KNEE REPLACEMENT, LEFT: ICD-10-CM

## 2023-10-16 PROCEDURE — 97110 THERAPEUTIC EXERCISES: CPT | Performed by: PHYSICAL THERAPIST

## 2023-10-16 PROCEDURE — 97112 NEUROMUSCULAR REEDUCATION: CPT | Performed by: PHYSICAL THERAPIST

## 2023-10-16 PROCEDURE — 97140 MANUAL THERAPY 1/> REGIONS: CPT | Performed by: PHYSICAL THERAPIST

## 2023-10-16 NOTE — PROGRESS NOTES
Daily Note     Today's date: 10/16/2023  Patient name: Felicia Overton  : 1955  MRN: [id number]  Referring provider: Aubrey King  Dx:   Encounter Diagnosis     ICD-10-CM    1. Acute pain of left knee  M25.562       2. Status post total knee replacement, left  Z96.652                      Subjective: Pt reports doing well with less pain and improved movement. Pt is questioning when she can progress from RW. Objective: See treatment diary below      Precautions: None        Manual  10/2/23 10/9/23 10/16/23      PROM L knee flex/ext  10 mins   10 mins   10 mins         grade I post tibial glides      2 mins                                                        Neuro Re-Ed             HR/TR   3"x15 ea  3"x15     Stand hip abd           Stand alt march   3"x10 ea  3:x10                                                           Therex            Rec bike    5 mins aarom  5 min aarom to arom      QS 5"x10  5"2x10  5"2x10         supine heel slides 5"x10  Strap 5"x10   strap 5"x10         seated heel slides hep            SAQ      3"x10        SLR      3"x10         LAQ   3"x10   hep                                    Gait Training              SPC R hand      500'x1             Modalities                                                      Assessment: Pt is able to progress knee ROM and flexibility to perform full revolutions on rec bike after w/u. Focused on gait at today's visit with good balance and stability to progress to [facility] in which pt was able to ambulate on even surface within PT facility across threshold and on uneven surface of the outside parking lot. Pt does cont to need cues to allow knee flexion during swing phase with transition to [facility], but able to correct. Pt progressing well with improved knee flex/ext ROM and issued updated HEP to encourage cont exercises. Independent SLR performed at today's visit with appropriate technique. Plan: Continue per plan of care.

## 2023-10-23 ENCOUNTER — OFFICE VISIT (OUTPATIENT)
Dept: PHYSICAL THERAPY | Facility: REHABILITATION | Age: 68
End: 2023-10-23
Payer: MEDICARE

## 2023-10-23 DIAGNOSIS — M25.562 ACUTE PAIN OF LEFT KNEE: Primary | ICD-10-CM

## 2023-10-23 DIAGNOSIS — Z96.652 STATUS POST TOTAL KNEE REPLACEMENT, LEFT: ICD-10-CM

## 2023-10-23 PROCEDURE — 97110 THERAPEUTIC EXERCISES: CPT | Performed by: PHYSICAL THERAPIST

## 2023-10-23 PROCEDURE — 97112 NEUROMUSCULAR REEDUCATION: CPT | Performed by: PHYSICAL THERAPIST

## 2023-10-23 PROCEDURE — 97140 MANUAL THERAPY 1/> REGIONS: CPT | Performed by: PHYSICAL THERAPIST

## 2023-10-23 NOTE — PROGRESS NOTES
Daily Note     Today's date: 10/23/2023  Patient name: Jurgen Carney  : 1955  MRN: [id number]  Referring provider: Bard Church  Dx:   Encounter Diagnosis     ICD-10-CM    1. Acute pain of left knee  M25.562       2. Status post total knee replacement, left  Z96.652                      Subjective: Pt reports using SPC or nothing for ambulation throughout her house yesterday with good tolerance during, however did have more discomfort in the evening. Objective: See treatment diary below      Precautions: None        Manual  10/2/23 10/9/23 10/16/23 10/23/23     PROM L knee flex/ext  10 mins   10 mins   10 mins  10 mins      grade I post tibial glides      2 mins   5 mins                                                      Neuro Re-Ed             HR/TR   3"x15 ea  3"x15 3"x20 ea    Stand hip abd           Stand alt march   3"x10 ea  3:x10  hep    Mini squats       3"x10                                               Therex            Rec bike    5 mins aarom  5 min aarom to arom 7 mins aarom to arom     QS 5"x10  5"2x10  5"2x10   5"2x10      supine heel slides 5"x10  Strap 5"x10   strap 5"x10  hep      seated heel slides hep            SAQ      3"x10 3"2x10      SLR      3"x10  QS 3"x10      LAQ   3"x10   hep        TERT ext       2# 1'x2                    Gait Training              SPC R hand      500'x1             Modalities                                                      Assessment: Pt does well with progression of ROM and flexibility that she is achieving between PT and HEP. Pt achieves -5 to 100 degree knee ROM with appropriate soft tissue restriction at endranges. Pt does have mild quad lag during SLR and encouraged to reset TKE with quad set prior to elevating leg. Pt issued updated HEP for SLR with focus on TKE. Plan: Continue per plan of care.

## 2023-10-30 ENCOUNTER — OFFICE VISIT (OUTPATIENT)
Dept: PHYSICAL THERAPY | Facility: REHABILITATION | Age: 68
End: 2023-10-30
Payer: MEDICARE

## 2023-10-30 DIAGNOSIS — M25.562 ACUTE PAIN OF LEFT KNEE: Primary | ICD-10-CM

## 2023-10-30 DIAGNOSIS — Z96.652 STATUS POST TOTAL KNEE REPLACEMENT, LEFT: ICD-10-CM

## 2023-10-30 PROCEDURE — 97140 MANUAL THERAPY 1/> REGIONS: CPT | Performed by: PHYSICAL THERAPIST

## 2023-10-30 PROCEDURE — 97110 THERAPEUTIC EXERCISES: CPT | Performed by: PHYSICAL THERAPIST

## 2023-10-30 PROCEDURE — 97150 GROUP THERAPEUTIC PROCEDURES: CPT | Performed by: PHYSICAL THERAPIST

## 2023-10-30 NOTE — PROGRESS NOTES
Daily Note     Today's date: 10/30/2023  Patient name: Maurice Vera  : 1955  MRN: [id number]  Referring provider: Shu Lopez  Dx:   Encounter Diagnosis     ICD-10-CM    1. Acute pain of left knee  M25.562       2. Status post total knee replacement, left  Z96.652                      Subjective: Pt reports doing well with exercises, does feel general "fatigue" and feeling more tired. Pt cont to have diff sleeping in which she is getting up every few hours to change positions. Objective: See treatment diary below      Precautions: None        Manual  10/2/23 10/9/23 10/16/23 10/23/23 10/30/23    PROM L knee flex/ext  10 mins   10 mins   10 mins  10 mins  10 mins     grade I post tibial glides      2 mins   5 mins   5 mins                                                    Neuro Re-Ed             HR/TR   3"x15 ea  3"x15 3"x20 ea hep   Stand hip abd           Stand alt march   3"x10 ea  3:x10  hep    Mini squats       3"x10  3"x15                                             Therex            Rec bike    5 mins aarom  5 min aarom to arom 7 mins aarom to arom 7 mins aarom to arom    QS 5"x10  5"2x10  5"2x10   5"2x10  5"x20     supine heel slides 5"x10  Strap 5"x10   strap 5"x10  hep      seated heel slides hep            SAQ      3"x10 3"2x10  5"x20     SLR      3"x10  QS 3"x10  QS 3"2x10     LAQ   3"x10   hep        TERT ext       2# 1'x2  np                  Gait Training              SPC R hand      500'x1             Modalities                                                      Assessment: Pt amb into facility without AD and demo's good knee flexion during swing phase compared with previous visits. Pt is progressing well with ROM into functional range with more restriction into endrange flexion-however has appropriate soft tissue endfeel present. Pt demo's good quad contract, but does state she struggles to feel it occurring.   Encouraged pt to cont to practice with visualization of quad contraction to assist with biofeedback. Pt has f/u with ortho on 11/8/23. Plan: Continue per plan of care. RE next visit.

## 2023-11-06 ENCOUNTER — OFFICE VISIT (OUTPATIENT)
Dept: PHYSICAL THERAPY | Facility: REHABILITATION | Age: 68
End: 2023-11-06
Payer: MEDICARE

## 2023-11-06 DIAGNOSIS — M25.562 ACUTE PAIN OF LEFT KNEE: Primary | ICD-10-CM

## 2023-11-06 DIAGNOSIS — Z96.652 STATUS POST TOTAL KNEE REPLACEMENT, LEFT: ICD-10-CM

## 2023-11-06 PROCEDURE — 97110 THERAPEUTIC EXERCISES: CPT | Performed by: PHYSICAL THERAPIST

## 2023-11-06 PROCEDURE — 97140 MANUAL THERAPY 1/> REGIONS: CPT | Performed by: PHYSICAL THERAPIST

## 2023-11-06 NOTE — PROGRESS NOTES
PT Re-Evaluation     Today's date: 2023  Patient name: Nichole Almazan  : 1955  MRN: [id number]  Referring provider: Jyoti Romero  Dx:   Encounter Diagnosis     ICD-10-CM    1. Acute pain of left knee  M25.562       2. Status post total knee replacement, left  [id number]                      Assessment  Assessment details: Pt is progressing well with increase in knee ROM, improvement in left LE strength with independent quad contraction, ambulation without AD, and ability to perform stairs reciprocally with SPC and rail for control. Pt cont to be limited with full control during stair negotiation, weakness in left LE and endrange funct ROM. Pt would benefit from cont skilled PT to address these limitations and max funct. Impairments: abnormal or restricted ROM, impaired physical strength, lacks appropriate home exercise program and pain with function    Goals  Funct 1. Increase ambulation with ' x4 weeks. -met  2. Improve stair negotiation non reciprocally x1 flight x4 weeks. -partially met   Impairment 1. Increase ROM by 25% x4 weeks-partially met  2. Decrease pain by 25 % x4 weeks-partially met  3. Increase strength by 1/2 grade x4 weeks. -partially met      Plan  Patient would benefit from: skilled physical therapy  Planned therapy interventions: home exercise program, neuromuscular re-education, therapeutic exercise, stretching, strengthening and manual therapy  Frequency: 1x week  Duration in weeks: 4        Subjective Evaluation    History of Present Illness  Mechanism of injury: Pt doing well with ambulation, currently not using AD. Pt is negotiating stairs non-reciprocally due to "unsure" if she is able to perform reciprocally and notes using wall for support secondary to no railing. Pt is using medication seldom, only at times for sleep as needed-pt is primarily using tylenol for pain control. Pt reports sx's as "ache" greater than pain.   Pt is driving without restrictions and able to perform car transfers independently. Pt notes improvement with sit to stand transfers as well. Patient Goals  Patient goals for therapy: decreased pain, independence with ADLs/IADLs and increased motion  Patient goal: -partially met  Pain  Current pain ratin  At worst pain ratin          Objective     Active Range of Motion   Left Knee   Flexion: 103 degrees   Extension: -10 degrees     Passive Range of Motion   Left Knee   Flexion: 110 degrees   Extension: -5 degrees     Additional Passive Range of Motion Details  Good healing of incision. Strength/Myotome Testing     Left Knee   Quadriceps contraction: good    Additional Strength Details  SLR without quad lag 4+/5    General Comments:      Knee Comments  Pt demo's good neutral knee position during mini squats  Pt is able to ascend and descend stairs reciprocally with use of SPC and railing for support.      Precautions: None        Manual  11/6/23 10/9/23 10/16/23 10/23/23 10/30/23    PROM L knee flex/ext   10 mins   10 mins  10 mins  10 mins     grade I post tibial glides      2 mins   5 mins   5 mins     RE 30 mins                                              Neuro Re-Ed             Side stepping nv                       Mini squats nv     3"x10  3"x15                                                           Therex             Rec bike  7 mins   5 mins aarom  5 min aarom to arom 7 mins aarom to arom 7 mins aarom to arom    QS reviewed 5"2x10  5"2x10   5"2x10  5"x20     supine heel slides reviewed Strap 5"x10   strap 5"x10  hep                  SAQ      3"x10 3"2x10  5"x20     SLR      3"x10  QS 3"x10  QS 3"2x10     LAQ   3"x10   hep        TERT ext  reviewed     2# 1'x2  np                  Gait Training             Stair negotiation reciprocally SPC/rail Tested/train NV               Modalities

## 2023-11-08 ENCOUNTER — OFFICE VISIT (OUTPATIENT)
Dept: OBGYN CLINIC | Facility: MEDICAL CENTER | Age: 68
End: 2023-11-08

## 2023-11-08 VITALS
BODY MASS INDEX: 30.25 KG/M2 | DIASTOLIC BLOOD PRESSURE: 80 MMHG | HEIGHT: 64 IN | HEART RATE: 71 BPM | WEIGHT: 177.2 LBS | SYSTOLIC BLOOD PRESSURE: 139 MMHG

## 2023-11-08 DIAGNOSIS — Z96.652 STATUS POST TOTAL KNEE REPLACEMENT, LEFT: Primary | ICD-10-CM

## 2023-11-08 PROCEDURE — 99024 POSTOP FOLLOW-UP VISIT: CPT | Performed by: ORTHOPAEDIC SURGERY

## 2023-11-08 RX ORDER — OXYCODONE HYDROCHLORIDE 5 MG/1
5 TABLET ORAL DAILY PRN
Qty: 7 TABLET | Refills: 0 | Status: SHIPPED | OUTPATIENT
Start: 2023-11-08

## 2023-11-08 NOTE — PROGRESS NOTES
Assessment/Plan:  1. Status post total knee replacement, left      No orders of the defined types were placed in this encounter. Patient is doing well 6 weeks status post L TKA on 9/27/23. Continue PT. We discussed continuing to work on her extension. Pain control prn- last oxycodone refill provided for use at bedtime. Patient aware she can split 5 mg tabs in half to wean from medication. Patient should call ahead for abx prior to dental appts. Return in about 4 weeks (around 12/6/2023) for L TKA post op 3 with Dr. Chanelle Houston. I answered all of the patient's questions during the visit and provided education of the patient's condition during the visit. The patient verbalized understanding of the information given and agrees with the plan. This note was dictated using [employer] software. It may contain errors including improperly dictated words. Please contact physician directly for any questions. Subjective   Chief Complaint:   Chief Complaint   Patient presents with    Left Knee - Post-op       Millie Ramirez is a 75 y.o. female who presents for 6 week follow up s/p left TKA. Patient is doing well. Patient states that overall her knee continues to improve. She does report a tingling sensation around her incision and lower leg as well as hypersensitivity around her incision. Patient is taking oxycodone primarily at nighttime or for therapy. She states she has completed aspirin. She continues to do outpatient physical therapy where she feels she is improving over time. Denies fevers, chills, distal numbness. Review of Systems  ROS:    See HPI for musculoskeletal review.    All other systems reviewed are negative     History:  Past Medical History:   Diagnosis Date    Left knee pain      Past Surgical History:   Procedure Laterality Date    CATARACT EXTRACTION      CORNEAL TRANSPLANT      EYE SURGERY      KNEE SURGERY      [place] ARTHRP KNE CONDYLE&PLATU MEDIAL&LAT COMPARTMENTS Left 9/27/2023    Procedure: ARTHROPLASTY KNEE TOTAL;  Surgeon: Candace Grande DO;  Location: [address] [place] [address] MAIN OR;  Service: Orthopedics     Social History   Social History     Substance and Sexual Activity   Alcohol Use Not Currently    Comment: 3 x year     Social History     Substance and Sexual Activity   Drug Use Never     Social History     Tobacco Use   Smoking Status Never   Smokeless Tobacco Never     Family History: No family history on file. Current Outpatient Medications on File Prior to Visit   Medication Sig Dispense Refill    acetaminophen (TYLENOL) 500 mg tablet Take 2 tablets (1,000 mg total) by mouth every 8 (eight) hours  0    Alphagan P 0.1 % INSTILL ONE DROP INTO THE RIGHT EYE EVERY 12 HOURS      ascorbic acid (VITAMIN C) 500 MG tablet Take 1 tablet (500 mg total) by mouth daily Begin 30 days prior to surgery 30 tablet 1    aspirin 325 mg tablet Take 1 tablet (325 mg total) by mouth 2 (two) times a day Take with food. 84 tablet 0    betamethasone, augmented, (DIPROLENE) 0.05 % ointment       celecoxib (CeleBREX) 200 mg capsule Take 1 capsule (200 mg total) by mouth daily Take with food. Do not take at the same time as aspirin. 30 capsule 1    docusate sodium (COLACE) 100 mg capsule Take 1 capsule (100 mg total) by mouth 2 (two) times a day 30 capsule 0    ferrous sulfate 324 (65 Fe) mg Take 1 tablet (324 mg total) by mouth in the morning Begin 30 days prior to surgery 30 tablet 1    folic acid (KP Folic Acid) 1 mg tablet Take 1 tablet (1 mg total) by mouth daily Begin 30 days prior to surgery 30 tablet 1    gabapentin (NEURONTIN) 100 mg capsule Take 1 capsule (100 mg total) by mouth 3 (three) times a day 90 capsule 0    Multiple Vitamin (multivitamin) tablet Take 1 tablet by mouth daily Begin 30 days prior to surgery 30 tablet 1    naloxone (NARCAN) 4 mg/0.1 mL nasal spray Administer 1 spray into a nostril. If no response after 2-3 minutes, give another dose in the other nostril using a new spray. 1 each 1    prednisoLONE acetate (PRED FORTE) 1 % ophthalmic suspension 1 drop once      promethazine (PHENERGAN) 12.5 MG tablet Take 1 tablet (12.5 mg total) by mouth every 6 (six) hours as needed for nausea or vomiting 12 tablet 0    Tafluprost, PF, 0.0015 % SOLN       [DISCONTINUED] oxyCODONE (ROXICODONE) 10 MG TABS Take 1 tablet (10 mg total) by mouth every 6 (six) hours as needed for severe pain Max Daily Amount: 40 mg 28 tablet 0     No current facility-administered medications on file prior to visit.      Allergies   Allergen Reactions    Fioricet [Butalbital-Apap-Caffeine] Rash     "red from the neck up"    Wound Dressing Adhesive Rash        Objective     /80   Pulse 71   Ht 5' 4" (1.626 m)   Wt 80.4 kg (177 lb 3.2 oz)   BMI 30.42 kg/m²      PE:  AAOx 3  WDWN  Hearing intact, no drainage from eyes  no audible wheezing  no abdominal distension  LE compartments soft, AT/GS intact    Ortho Exam:  left Knee:   INC: healed, No erythema, mild swelling  AROM:3 - 110 degrees

## 2023-11-13 ENCOUNTER — OFFICE VISIT (OUTPATIENT)
Dept: PHYSICAL THERAPY | Facility: REHABILITATION | Age: 68
End: 2023-11-13
Payer: MEDICARE

## 2023-11-13 DIAGNOSIS — Z96.652 STATUS POST TOTAL KNEE REPLACEMENT, LEFT: ICD-10-CM

## 2023-11-13 DIAGNOSIS — M25.562 ACUTE PAIN OF LEFT KNEE: Primary | ICD-10-CM

## 2023-11-13 PROCEDURE — 97140 MANUAL THERAPY 1/> REGIONS: CPT | Performed by: PHYSICAL THERAPIST

## 2023-11-13 PROCEDURE — 97110 THERAPEUTIC EXERCISES: CPT | Performed by: PHYSICAL THERAPIST

## 2023-11-13 PROCEDURE — 97112 NEUROMUSCULAR REEDUCATION: CPT | Performed by: PHYSICAL THERAPIST

## 2023-11-13 NOTE — PROGRESS NOTES
Daily Note     Today's date: 2023  Patient name: Miranda Johnston  : 1955  MRN: [id number]  Referring provider: Loreto Glaser  Dx:   Encounter Diagnosis     ICD-10-CM    1. Acute pain of left knee  M25.562       2. Status post total knee replacement, left  Z96.652           Start Time: 1030  Stop Time: 1114  Total time in clinic (min): 44 minutes    Subjective: Patient reports that she was able to attempt reciprocal step pattern during both ascent/descent a few times over the past week. She was able to complete it with safely but did require HR assist.        Objective: See treatment diary below    Manual  11/6/23 11/13/23 10/16/23 10/23/23 10/30/23    PROM L knee flex/ext  + HF stretch 10 min  10 mins  10 mins  10 mins     grade I post tibial glides   4 min  2 mins   5 mins   5 mins     RE 30 mins                                           Neuro Re-Ed            Side stepping nv Length of bar 5x                       Mini squats nv 10x3"    3"x10  3"x15    standing TKE   Yellow 20x5"                                                Therex            Rec bike  7 mins  7 min  5 min aarom to arom 7 mins aarom to arom 7 mins aarom to arom    QS reviewed 20x5"  5"2x10   5"2x10  5"x20     supine heel slides reviewed   strap 5"x10  hep                 SAQ   3"x20 1#  3"x10 3"2x10  5"x20     SLR   10x3"  3"x10  QS 3"x10  QS 3"2x10     LAQ     hep        TERT ext  reviewed    2# 1'x2  np                 Gait Training            Stair negotiation reciprocally SPC/rail Tested/train NV Just rail 1 flight              Modalities                                                           Assessment: Patient continues to demonstrate good tolerance to PT with no aggravation of sx reported following prescribed activity. In addition, patient was able to ascend and descend 1 flight of steps independently with reciprocal step pattern and use of HR on R side.  Patient demonstrates loss of eccentric control just prior to planting R foot on lower step during descent. This is consistent with decreased ability to isolate quadriceps muscle with TE. Cueing provided to avoid crossing her LE past midline with SLR and to avoid excessive hip motion with TKE, which made exercises significantly more challenging. Continue to address strength deficits to improve functional independence. Plan: Continue per plan of care.

## 2023-11-20 ENCOUNTER — OFFICE VISIT (OUTPATIENT)
Dept: PHYSICAL THERAPY | Facility: REHABILITATION | Age: 68
End: 2023-11-20
Payer: MEDICARE

## 2023-11-20 DIAGNOSIS — M25.562 ACUTE PAIN OF LEFT KNEE: Primary | ICD-10-CM

## 2023-11-20 DIAGNOSIS — Z96.652 STATUS POST TOTAL KNEE REPLACEMENT, LEFT: ICD-10-CM

## 2023-11-20 PROCEDURE — 97140 MANUAL THERAPY 1/> REGIONS: CPT | Performed by: PHYSICAL THERAPIST

## 2023-11-20 PROCEDURE — 97112 NEUROMUSCULAR REEDUCATION: CPT | Performed by: PHYSICAL THERAPIST

## 2023-11-20 PROCEDURE — 97110 THERAPEUTIC EXERCISES: CPT | Performed by: PHYSICAL THERAPIST

## 2023-11-20 NOTE — PROGRESS NOTES
Daily Note     Today's date: 2023  Patient name: Cady Vernon  : 1955  MRN: [id number]  Referring provider: Tarun Rocha  Dx:   Encounter Diagnosis     ICD-10-CM    1. Acute pain of left knee  M25.562       2. Status post total knee replacement, left  Z96.652                      Subjective: Pt reports that she is doing well, was having positive soreness for 2 days post last treatment, but feels she is improved overall with funct around home. Pt does note wishing she could negotiate stairs better. Objective: See treatment diary below    Manual  11/6/23 11/13/23 11/20/23 10/23/23 10/30/23    PROM L knee flex/ext  + HF stretch 10 min 10 mins  10 mins  10 mins     grade I post tibial glides   4 min 5 mins   5 mins   5 mins     RE 30 mins                                           Neuro Re-Ed            Side stepping nv Length of bar 5x  X5 ea                     Mini squats nv 10x3"   3"x15  3"x10  3"x15    standing TKE   Yellow 20x5"  rtb 3"2x10                                               Therex            Rec bike  7 mins  7 min 7 mins  7 mins aarom to arom 7 mins aarom to arom    QS reviewed 20x5" hep  5"2x10  5"x20     supine heel slides reviewed   hep                 SAQ   3"x20 1# 2# 2x10 3"2x10  5"x20     SLR   10x3" 3"2x10  QS 3"x10  QS 3"2x10     LAQ            TERT ext  reviewed    2# 1'x2  np                 Gait Training            Lateral step up/down   Rail assist x10 ea     Stair negotiation reciprocally SPC/rail Tested/train NV Just rail 1 flight 1 flight ascending 1 rail/SPC             Modalities                                                           Assessment: Pt does well with increase in quad activation to assist with TKE. Pt does cont to be limited with endrange extension passive ROM and encouraged to cont to perform TERT extension for ROM and stretching.   Pt performs stair negotiation reciprocally well, does have diff with last 10-20 degrees during eccentric control when descending, added lateral step up/down to assist with control. Plan: Continue per plan of care.

## 2023-11-27 ENCOUNTER — OFFICE VISIT (OUTPATIENT)
Dept: PHYSICAL THERAPY | Facility: REHABILITATION | Age: 68
End: 2023-11-27
Payer: MEDICARE

## 2023-11-27 DIAGNOSIS — Z96.652 STATUS POST TOTAL KNEE REPLACEMENT, LEFT: ICD-10-CM

## 2023-11-27 DIAGNOSIS — M25.562 ACUTE PAIN OF LEFT KNEE: Primary | ICD-10-CM

## 2023-11-27 PROCEDURE — 97110 THERAPEUTIC EXERCISES: CPT | Performed by: PHYSICAL THERAPIST

## 2023-11-27 PROCEDURE — 97140 MANUAL THERAPY 1/> REGIONS: CPT | Performed by: PHYSICAL THERAPIST

## 2023-11-27 NOTE — PROGRESS NOTES
Daily Note     Today's date: 2023  Patient name: Cali Morton  : 1955  MRN: [id number]  Referring provider: Sheryle Shove  Dx:   Encounter Diagnosis     ICD-10-CM    1. Acute pain of left knee  M25.562       2. Status post total knee replacement, left  [id number]                      Subjective:Pt notes feeling stiffer today, does report that she hasn't been as diligent with her home exercises. Pt feels she "slacked off" due to the holidays. Objective: See treatment diary below    Manual  11/6/23 11/13/23 11/20/23 11/27/23 10/30/23    PROM L knee flex/ext  + HF stretch 10 min 10 mins  20 mins   10 mins     grade I post tibial glides   4 min 5 mins  10 mins   5 mins     RE 30 mins                                           Neuro Re-Ed            Side stepping nv Length of bar 5x  X5 ea                     Mini squats nv 10x3"   3"x15   3"x15    standing TKE   Yellow 20x5"  rtb 3"2x10                                               Therex            Rec bike  7 mins  7 min 7 mins  8 mins  7 mins aarom to arom    QS reviewed 20x5" hep   5"x20     supine heel slides reviewed                    SAQ   3"x20 1# 2# 2x10   5"x20     SLR   10x3" 3"2x10    QS 3"2x10     LAQ            TERT ext  reviewed      np    hep review      5 mins       Gait Training            Lateral step up/down   Rail assist x10 ea     Stair negotiation reciprocally SPC/rail Tested/train NV Just rail 1 flight 1 flight ascending 1 rail/SPC             Modalities                                                           Assessment: Pt demo's decrease in left knee ROM compared with last RE due to increase in stiffness and discomfort. Pt reports performing heel slides and flexion every other day and encouraged to cont to focus on flexibility throughout her day a few times to assist with ROM, especially as she is increasing her funct walking and stair negotiation and to not limit progression. Pt will be RE next visit. Plan: Continue per plan of care.

## 2023-12-05 ENCOUNTER — EVALUATION (OUTPATIENT)
Dept: PHYSICAL THERAPY | Facility: REHABILITATION | Age: 68
End: 2023-12-05
Payer: MEDICARE

## 2023-12-05 DIAGNOSIS — Z96.652 STATUS POST TOTAL KNEE REPLACEMENT, LEFT: ICD-10-CM

## 2023-12-05 DIAGNOSIS — M25.562 ACUTE PAIN OF LEFT KNEE: Primary | ICD-10-CM

## 2023-12-05 PROCEDURE — 97110 THERAPEUTIC EXERCISES: CPT | Performed by: PHYSICAL THERAPIST

## 2023-12-05 PROCEDURE — 97140 MANUAL THERAPY 1/> REGIONS: CPT | Performed by: PHYSICAL THERAPIST

## 2023-12-05 NOTE — PROGRESS NOTES
PT Re-Evaluation     Today's date: 2023  Patient name: Crystal Barrios  : 1955  MRN: [id number]  Referring provider: Lore Sims  Dx:   Encounter Diagnosis     ICD-10-CM    1. Acute pain of left knee  M25.562       2. Status post total knee replacement, left  [id number]                      Assessment  Assessment details: Pt is progressing well with PT with increase in knee ROM, improvement in left LE strength, and good funct return to previous activities including negotiation of stairs which she was limited with prior to surgery. Pt does cont to be limited with endrange ROM flex and extension. Pt has f/u with ortho tomorrow, will cont/DC based on ortho's determination. Impairments: abnormal or restricted ROM, impaired physical strength, lacks appropriate home exercise program and pain with function    Goals  Funct 1. Increase ambulation with ' x4 weeks. -met  2. Improve stair negotiation non reciprocally x1 flight x4 weeks. -partially met   Impairment 1. Increase ROM by 25% x4 weeks-partially met  2. Decrease pain by 25 % x4 weeks-partially met  3. Increase strength by 1/2 grade x4 weeks. -partially met      Plan  Patient would benefit from: skilled physical therapy  Planned therapy interventions: home exercise program, neuromuscular re-education, therapeutic exercise, stretching, strengthening and manual therapy  Frequency: 1x week  Duration in weeks: 2        Subjective Evaluation    History of Present Illness  Mechanism of injury: Pt notes that her knee is "alright."  Pt feels stiffness initially upon getting OOB, but improves as she performs movement and activity. Pt is negotiating stairs reciprocally when ascending, does cont to use SPC when descending when going down her cellar steps for safety. Pt denies use of medication for knee sx's throughout her day.   Pt is sleeping well with waking min due to sx's, noting that she does have to change positions at times to fall asleep, but improved through the night. Pt is progressing well funct with scoring 71 on her FOTO. Patient Goals  Patient goals for therapy: decreased pain, independence with ADLs/IADLs and increased motion  Patient goal: -partially met  Pain  Current pain ratin  At worst pain ratin            LE strength 5/5 knee flex/ext  SLR 4+/5 without quad lag  Knee ROM active -10 extension to 110 flexion  Knee ROM passive -5 extension to 112 flexion  Pt able to ascend/descend stairs reciprocally with use of railing. Does demo slight diminish in eccentric control when descending on the last few degrees.       Objective: See treatment diary below     Manual  23    PROM L knee flex/ext   + HF stretch 10 min 10 mins  20 mins  20 mins    grade I post tibial glides   4 min 5 mins  10 mins   5 mins     RE 30 mins        10 mins                                      Neuro Re-Ed             Side stepping nv Length of bar 5x  X5 ea                                   Mini squats nv 10x3"   3"x15        standing TKE   Yellow 20x5"  rtb 3"2x10                                                  Therex             Rec bike  7 mins  7 min 7 mins  8 mins  8 mins     QS reviewed 20x5" hep       supine heel slides reviewed                         SAQ   3"x20 1# 2# 2x10       SLR   10x3" 3"2x10        LAQ             TERT ext  reviewed           hep review       5 mins       Gait Training             Lateral step up/down     Rail assist x10 ea       Stair negotiation reciprocally SPC/rail Tested/train NV Just rail 1 flight 1 flight ascending 1 rail/SPC             Modalities

## 2023-12-06 ENCOUNTER — OFFICE VISIT (OUTPATIENT)
Dept: OBGYN CLINIC | Facility: MEDICAL CENTER | Age: 68
End: 2023-12-06

## 2023-12-06 VITALS
SYSTOLIC BLOOD PRESSURE: 138 MMHG | BODY MASS INDEX: 30.29 KG/M2 | WEIGHT: 177.4 LBS | DIASTOLIC BLOOD PRESSURE: 72 MMHG | HEART RATE: 79 BPM | HEIGHT: 64 IN

## 2023-12-06 DIAGNOSIS — Z96.652 STATUS POST TOTAL KNEE REPLACEMENT, LEFT: Primary | ICD-10-CM

## 2023-12-06 PROCEDURE — 99024 POSTOP FOLLOW-UP VISIT: CPT | Performed by: ORTHOPAEDIC SURGERY

## 2023-12-06 NOTE — PROGRESS NOTES
Assessment/Plan:  1. Status post total knee replacement, left      No orders of the defined types were placed in this encounter. Patient is doing well 10 weeks status post left TKA on 9/27/2023. Continue PT, Transition Into HEP. Strongly encouraged patient to continue to keep up with HEP to focus on left knee ROM as she still needs to work on flexion and extension. Pain control prn- Tylenol   Advised patient to use Ibroprophen sparingly   Patient can call ahead for abx prior to dental appts. Return in about 9 months (around 9/6/2024) for annual LTKA and repeat xrays. I answered all of the patient's questions during the visit and provided education of the patient's condition during the visit. The patient verbalized understanding of the information given and agrees with the plan. This note was dictated using [employer]uf[employer] software. It may contain errors including improperly dictated words. Please contact physician directly for any questions. Subjective   Chief Complaint: No chief complaint on file. García Cain is a 70 y.o. female who presents for 10 week follow up s/p left TKA. Patient states she is overall doing well at today's visit. Patient states she continues to do PT and states she only has 2 more visits left. Patient states she has no complaints and no pain at today's visit and is able to return to activities of daily living without complications. Patient states she only takes Tylenol before therapy. Patient states she does not take any other medications. Patient is overall pleased with her progress. Review of Systems  ROS:    See HPI for musculoskeletal review.    All other systems reviewed are negative     History:  Past Medical History:   Diagnosis Date    Left knee pain      Past Surgical History:   Procedure Laterality Date    CATARACT EXTRACTION      CORNEAL TRANSPLANT      EYE SURGERY      KNEE SURGERY      [place] ARTHRP KNE CONDYLE&PLATU MEDIAL&LAT COMPARTMENTS Left 9/27/2023    Procedure: ARTHROPLASTY KNEE TOTAL;  Surgeon: Sheree Valadez DO;  Location: [address] [place] [address] MAIN OR;  Service: Orthopedics     Social History   Social History     Substance and Sexual Activity   Alcohol Use Not Currently    Comment: 3 x year     Social History     Substance and Sexual Activity   Drug Use Never     Social History     Tobacco Use   Smoking Status Never   Smokeless Tobacco Never     Family History: No family history on file. Current Outpatient Medications on File Prior to Visit   Medication Sig Dispense Refill    acetaminophen (TYLENOL) 500 mg tablet Take 2 tablets (1,000 mg total) by mouth every 8 (eight) hours  0    Alphagan P 0.1 % INSTILL ONE DROP INTO THE RIGHT EYE EVERY 12 HOURS      ascorbic acid (VITAMIN C) 500 MG tablet Take 1 tablet (500 mg total) by mouth daily Begin 30 days prior to surgery 30 tablet 1    aspirin 325 mg tablet Take 1 tablet (325 mg total) by mouth 2 (two) times a day Take with food. 84 tablet 0    betamethasone, augmented, (DIPROLENE) 0.05 % ointment       celecoxib (CeleBREX) 200 mg capsule Take 1 capsule (200 mg total) by mouth daily Take with food. Do not take at the same time as aspirin. 30 capsule 1    docusate sodium (COLACE) 100 mg capsule Take 1 capsule (100 mg total) by mouth 2 (two) times a day 30 capsule 0    ferrous sulfate 324 (65 Fe) mg Take 1 tablet (324 mg total) by mouth in the morning Begin 30 days prior to surgery 30 tablet 1    folic acid (KP Folic Acid) 1 mg tablet Take 1 tablet (1 mg total) by mouth daily Begin 30 days prior to surgery 30 tablet 1    gabapentin (NEURONTIN) 100 mg capsule Take 1 capsule (100 mg total) by mouth 3 (three) times a day 90 capsule 0    Multiple Vitamin (multivitamin) tablet Take 1 tablet by mouth daily Begin 30 days prior to surgery 30 tablet 1    naloxone (NARCAN) 4 mg/0.1 mL nasal spray Administer 1 spray into a nostril. If no response after 2-3 minutes, give another dose in the other nostril using a new spray. 1 each 1    oxyCODONE (ROXICODONE) 5 immediate release tablet Take 1 tablet (5 mg total) by mouth daily as needed for severe pain Max Daily Amount: 5 mg 7 tablet 0    prednisoLONE acetate (PRED FORTE) 1 % ophthalmic suspension 1 drop once      promethazine (PHENERGAN) 12.5 MG tablet Take 1 tablet (12.5 mg total) by mouth every 6 (six) hours as needed for nausea or vomiting 12 tablet 0    Tafluprost, PF, 0.0015 % SOLN        No current facility-administered medications on file prior to visit.      Allergies   Allergen Reactions    Fioricet [Butalbital-Apap-Caffeine] Rash     "red from the neck up"    Wound Dressing Adhesive Rash        Objective     /72   Pulse 79   Ht 5' 4" (1.626 m)   Wt 80.5 kg (177 lb 6.4 oz)   BMI 30.45 kg/m²      PE:  AAOx 3  WDWN  Hearing intact, no drainage from eyes  no audible wheezing  no abdominal distension  LE compartments soft, AT/GS intact    Ortho Exam:  left Knee:   INC: healed, No erythema, mild swelling  AROM: 5- 110 degrees    Scribe Attestation      I,:  [facility] am acting as a scribe while in the presence of the attending physician.:       I,:  Loreto Glaser, DO personally performed the services described in this documentation    as scribed in my presence.:

## 2023-12-11 ENCOUNTER — OFFICE VISIT (OUTPATIENT)
Dept: PHYSICAL THERAPY | Facility: REHABILITATION | Age: 68
End: 2023-12-11
Payer: MEDICARE

## 2023-12-11 DIAGNOSIS — Z96.652 STATUS POST TOTAL KNEE REPLACEMENT, LEFT: ICD-10-CM

## 2023-12-11 DIAGNOSIS — M25.562 ACUTE PAIN OF LEFT KNEE: Primary | ICD-10-CM

## 2023-12-11 PROCEDURE — 97140 MANUAL THERAPY 1/> REGIONS: CPT | Performed by: PHYSICAL THERAPIST

## 2023-12-11 PROCEDURE — 97110 THERAPEUTIC EXERCISES: CPT | Performed by: PHYSICAL THERAPIST

## 2023-12-11 NOTE — PROGRESS NOTES
Daily Note     Today's date: 2023  Patient name: Kristie Tadeo  : 1955  MRN: [id number]  Referring provider: Nas Cifuentes  Dx:   Encounter Diagnosis     ICD-10-CM    1. Acute pain of left knee  M25.562       2. Status post total knee replacement, left  Z96.652                      Subjective: Pt had f/u with ortho last week and wants to cont PT for ROM over the next few visits prior to being discharged to [facility]. Objective: See treatment diary below     Manual  23    PROM L knee flex/ext  20 mins  + HF stretch 10 min 10 mins  20 mins  20 mins    grade I post tibial glides  5 mins  4 min 5 mins  10 mins   5 mins     RE        10 mins                                      Neuro Re-Ed             Side stepping  Length of bar 5x  X5 ea                                 Mini squats  10x3"   3"x15         standing TKE   Yellow 20x5"  rtb 3"2x10                                                  Therex             Rec bike  8 mins  7 min 7 mins  8 mins  8 mins     QS 5"x10  20x5" hep        supine heel slides 5"x10             heel slides with strap 5"x10             SAQ   3"x20 1# 2# 2x10        SLR   10x3" 3"2x10        Knee flex stretch on stool 5"x10             TERT ext 1' x3           Seated knee flexion AAROM 5"x10          Gait Training            Lateral step up/down    Rail assist x10 ea       Stair negotiation reciprocally SPC/rail  Just rail 1 flight 1 flight ascending 1 rail/SPC             Modalities                                                              Assessment: Pt does well with focus on today's visit for ROM including HEP. Pt notes discomfort at endrange, which dissipates post stretch. Pt encouraged to cont to focus on endrange ROM to assist with her flexibility and carry over to home. Plan: Continue per plan of care. DC next visit.

## 2023-12-19 ENCOUNTER — OFFICE VISIT (OUTPATIENT)
Dept: PHYSICAL THERAPY | Facility: REHABILITATION | Age: 68
End: 2023-12-19
Payer: MEDICARE

## 2023-12-19 DIAGNOSIS — M25.562 ACUTE PAIN OF LEFT KNEE: Primary | ICD-10-CM

## 2023-12-19 DIAGNOSIS — Z96.652 STATUS POST TOTAL KNEE REPLACEMENT, LEFT: ICD-10-CM

## 2023-12-19 PROCEDURE — 97110 THERAPEUTIC EXERCISES: CPT | Performed by: PHYSICAL THERAPIST

## 2023-12-19 PROCEDURE — 97140 MANUAL THERAPY 1/> REGIONS: CPT | Performed by: PHYSICAL THERAPIST

## 2023-12-19 NOTE — PROGRESS NOTES
"Daily Note/Discharge     Today's date: 2023  Patient name: Lin Munoz  : 1955  MRN: [id number]  Referring provider: Deirdre Clinton,*  Dx:   Encounter Diagnosis     ICD-10-CM    1. Acute pain of left knee  M25.562       2. Status post total knee replacement, left  Z96.652                      Subjective: Pt notes doing stairs frequently due to bathroom leak and was able to accomplish a few times per night.        Objective: See treatment diary below     Manual  23    PROM L knee flex/ext  20 mins  10 mins 10 mins  20 mins  20 mins    grade I post tibial glides  5 mins   5 mins  10 mins   5 mins     RE        10 mins                                      Neuro Re-Ed             Side stepping   X5 ea                               Mini squats    3\"x15         standing TKE     rtb 3\"2x10                                                Therex             Rec bike  8 mins  8 mins  7 mins  8 mins  8 mins     QS 5\"x10  5\" x10 hep        supine heel slides 5\"x10   5\"x10          heel slides with strap 5\"x10  5\"x10          SAQ    2# 2x10        SLR   3\"x10 3\"2x10        Knee flex stretch on stool 5\"x10  5\"x10          TERT ext 1' x3 1' x3          Seated knee flexion AAROM 5\"x10  5\"x10         Gait Training            Lateral step up/down          Stair negotiation reciprocally SPC/rail                Modalities                                                              Assessment: Pt demo's funct knee ROM flexion and extension -3 to 115 without pain and good soft tissue endfeel present.  Pt amb without use of AD, negotiate stairs reciprocally without restriction, and has returned to normal levels of funct.  Pt has met all funct and impairment goals with PT.     Goals  Funct 1. Increase ambulation with ' x4 weeks.-met  2. Improve stair negotiation non reciprocally x1 flight x4 weeks.-met   Impairment 1. Increase ROM by 25% x4 weeks-met  2. Decrease " pain by 25 % x4 weeks-met  3. Increase strength by 1/2 grade x4 weeks.-met    Plan: DC from PT to cont with HEP.

## 2024-03-04 DIAGNOSIS — Z96.652 HISTORY OF TOTAL KNEE REPLACEMENT, LEFT: Primary | ICD-10-CM

## 2024-03-04 RX ORDER — AMOXICILLIN 500 MG/1
2000 CAPSULE ORAL ONCE
Qty: 4 CAPSULE | Refills: 2 | Status: SHIPPED | OUTPATIENT
Start: 2024-03-04 | End: 2024-03-04

## 2024-09-04 ENCOUNTER — OFFICE VISIT (OUTPATIENT)
Dept: OBGYN CLINIC | Facility: MEDICAL CENTER | Age: 69
End: 2024-09-04
Payer: MEDICARE

## 2024-09-04 ENCOUNTER — APPOINTMENT (OUTPATIENT)
Dept: RADIOLOGY | Facility: MEDICAL CENTER | Age: 69
End: 2024-09-04
Payer: MEDICARE

## 2024-09-04 VITALS
SYSTOLIC BLOOD PRESSURE: 132 MMHG | BODY MASS INDEX: 30.56 KG/M2 | DIASTOLIC BLOOD PRESSURE: 78 MMHG | HEIGHT: 64 IN | HEART RATE: 64 BPM | WEIGHT: 179 LBS

## 2024-09-04 DIAGNOSIS — M25.561 RIGHT KNEE PAIN, UNSPECIFIED CHRONICITY: ICD-10-CM

## 2024-09-04 DIAGNOSIS — Z96.652 STATUS POST TOTAL KNEE REPLACEMENT, LEFT: ICD-10-CM

## 2024-09-04 DIAGNOSIS — M17.11 PRIMARY OSTEOARTHRITIS OF RIGHT KNEE: ICD-10-CM

## 2024-09-04 DIAGNOSIS — Z96.652 STATUS POST TOTAL KNEE REPLACEMENT, LEFT: Primary | ICD-10-CM

## 2024-09-04 PROCEDURE — 73564 X-RAY EXAM KNEE 4 OR MORE: CPT

## 2024-09-04 PROCEDURE — 73562 X-RAY EXAM OF KNEE 3: CPT

## 2024-09-04 PROCEDURE — 1124F ACP DISCUSS-NO DSCNMKR DOCD: CPT | Performed by: ORTHOPAEDIC SURGERY

## 2024-09-04 PROCEDURE — 99214 OFFICE O/P EST MOD 30 MIN: CPT | Performed by: ORTHOPAEDIC SURGERY

## 2024-09-04 NOTE — PROGRESS NOTES
Assessment & Plan     1. Status post total knee replacement, left    2. Primary osteoarthritis of right knee    3. Right knee pain, unspecified chronicity      Orders Placed This Encounter   Procedures    XR knee 3 vw left non injury    XR knee 4+ vw right injury         Patient has severe right knee osteoarthritis. She is also doing well about 1 year status post L TKA on 9/27/23.   Non operative and operative treatment options were reviewed with patient today.   Patient is a surgical candidate at this time but is not interested in having her knee replaced.   Injections: Patient declined any injections for now. She is aware that steroid and gel injections are an option.   Medications: Tylenol up to 3000 mg per day and OTC NSAID prn pain  PT: Declined PT script. Patient may call if they would like a script placed in chart. She will resume her HEP that she learned after her total knee.   Bracing: Patient provided with short hinge knee brace to wear as needed for comfort.   Activity: Continue activity as tolerated.   Plan for next appt:  consider R TKA, repeat left TKA xrays every 2-3 years.       Return in about 2 years (around 9/4/2026) for L TKA annual check, or sooner if needed for right knee.    I answered all of the patient's questions during the visit and provided education of the patient's condition during the visit.  The patient verbalized understanding of the information given and agrees with the plan.  This note was dictated using [employer] software.  It may contain errors including improperly dictated words.  Please contact physician directly for any questions.    History of Present Illness   Chief complaint:   Chief Complaint   Patient presents with    Left Knee - Follow-up    Right Knee - Pain       HPI: Lin Munoz is a 68 y.o. female that c/o right knee pain.      Mechanism of Injury: none  Pain Description: right knee pain, intermittent, notes most with prolonged standing   Palliating Factors:  nothing  Provoking Factors: standing   Associated Symptoms: notes some associated ankle pain when knee is flared up or tired   Medications: taking tylenol occasionally as needed  Able to take NSAIDs? If not, why: yes  Physical Therapy or Home Exercises: none at this time but does recall her HEP for the left knee   Injections: had a previous injection which was not a good experience due to severe pain during the injection  Bracing: none but interested in trying a brace   Previous Surgery: none on the right knee   Miscellaneous: patient is 1 year out from L TKA which is doing well. She notes no pain in the knee. She does feel slightly limited when descending the stairs. Overall she is happy with her left total knee replacement. Patient notes that she helps care for her mother so it is not ideal timing to pursue surgery. She is working.      ROS:    See HPI for musculoskeletal review.   All other systems reviewed are negative     Historical Information   Past Medical History:   Diagnosis Date    Left knee pain      Past Surgical History:   Procedure Laterality Date    CATARACT EXTRACTION      CORNEAL TRANSPLANT      EYE SURGERY      KNEE SURGERY      [place] ARTHRP KNE CONDYLE&PLATU MEDIAL&LAT COMPARTMENTS Left 9/27/2023    Procedure: ARTHROPLASTY KNEE TOTAL;  Surgeon: Deirdre Clinton DO;  Location:  MAIN OR;  Service: Orthopedics     Social History   Social History     Substance and Sexual Activity   Alcohol Use Not Currently    Comment: 3 x year     Social History     Substance and Sexual Activity   Drug Use Never     Social History     Tobacco Use   Smoking Status Never   Smokeless Tobacco Never     Family History: No family history on file.    Current Outpatient Medications on File Prior to Visit   Medication Sig Dispense Refill    acetaminophen (TYLENOL) 500 mg tablet Take 2 tablets (1,000 mg total) by mouth every 8 (eight) hours  0    Alphagan P 0.1 % INSTILL ONE DROP INTO THE RIGHT EYE EVERY 12 HOURS       "ascorbic acid (VITAMIN C) 500 MG tablet Take 1 tablet (500 mg total) by mouth daily Begin 30 days prior to surgery 30 tablet 1    aspirin 325 mg tablet Take 1 tablet (325 mg total) by mouth 2 (two) times a day Take with food. 84 tablet 0    betamethasone, augmented, (DIPROLENE) 0.05 % ointment       celecoxib (CeleBREX) 200 mg capsule Take 1 capsule (200 mg total) by mouth daily Take with food. Do not take at the same time as aspirin. 30 capsule 1    docusate sodium (COLACE) 100 mg capsule Take 1 capsule (100 mg total) by mouth 2 (two) times a day 30 capsule 0    ferrous sulfate 324 (65 Fe) mg Take 1 tablet (324 mg total) by mouth in the morning Begin 30 days prior to surgery 30 tablet 1    folic acid (KP Folic Acid) 1 mg tablet Take 1 tablet (1 mg total) by mouth daily Begin 30 days prior to surgery 30 tablet 1    gabapentin (NEURONTIN) 100 mg capsule Take 1 capsule (100 mg total) by mouth 3 (three) times a day 90 capsule 0    Multiple Vitamin (multivitamin) tablet Take 1 tablet by mouth daily Begin 30 days prior to surgery 30 tablet 1    naloxone (NARCAN) 4 mg/0.1 mL nasal spray Administer 1 spray into a nostril. If no response after 2-3 minutes, give another dose in the other nostril using a new spray. 1 each 1    oxyCODONE (ROXICODONE) 5 immediate release tablet Take 1 tablet (5 mg total) by mouth daily as needed for severe pain Max Daily Amount: 5 mg 7 tablet 0    prednisoLONE acetate (PRED FORTE) 1 % ophthalmic suspension 1 drop once      promethazine (PHENERGAN) 12.5 MG tablet Take 1 tablet (12.5 mg total) by mouth every 6 (six) hours as needed for nausea or vomiting 12 tablet 0    Tafluprost, PF, 0.0015 % SOLN        No current facility-administered medications on file prior to visit.     Allergies   Allergen Reactions    Fioricet [Butalbital-Apap-Caffeine] Rash     \"red from the neck up\"    Wound Dressing Adhesive Rash       Current Outpatient Medications on File Prior to Visit   Medication Sig Dispense " Refill    acetaminophen (TYLENOL) 500 mg tablet Take 2 tablets (1,000 mg total) by mouth every 8 (eight) hours  0    Alphagan P 0.1 % INSTILL ONE DROP INTO THE RIGHT EYE EVERY 12 HOURS      ascorbic acid (VITAMIN C) 500 MG tablet Take 1 tablet (500 mg total) by mouth daily Begin 30 days prior to surgery 30 tablet 1    aspirin 325 mg tablet Take 1 tablet (325 mg total) by mouth 2 (two) times a day Take with food. 84 tablet 0    betamethasone, augmented, (DIPROLENE) 0.05 % ointment       celecoxib (CeleBREX) 200 mg capsule Take 1 capsule (200 mg total) by mouth daily Take with food. Do not take at the same time as aspirin. 30 capsule 1    docusate sodium (COLACE) 100 mg capsule Take 1 capsule (100 mg total) by mouth 2 (two) times a day 30 capsule 0    ferrous sulfate 324 (65 Fe) mg Take 1 tablet (324 mg total) by mouth in the morning Begin 30 days prior to surgery 30 tablet 1    folic acid (KP Folic Acid) 1 mg tablet Take 1 tablet (1 mg total) by mouth daily Begin 30 days prior to surgery 30 tablet 1    gabapentin (NEURONTIN) 100 mg capsule Take 1 capsule (100 mg total) by mouth 3 (three) times a day 90 capsule 0    Multiple Vitamin (multivitamin) tablet Take 1 tablet by mouth daily Begin 30 days prior to surgery 30 tablet 1    naloxone (NARCAN) 4 mg/0.1 mL nasal spray Administer 1 spray into a nostril. If no response after 2-3 minutes, give another dose in the other nostril using a new spray. 1 each 1    oxyCODONE (ROXICODONE) 5 immediate release tablet Take 1 tablet (5 mg total) by mouth daily as needed for severe pain Max Daily Amount: 5 mg 7 tablet 0    prednisoLONE acetate (PRED FORTE) 1 % ophthalmic suspension 1 drop once      promethazine (PHENERGAN) 12.5 MG tablet Take 1 tablet (12.5 mg total) by mouth every 6 (six) hours as needed for nausea or vomiting 12 tablet 0    Tafluprost, PF, 0.0015 % SOLN        No current facility-administered medications on file prior to visit.       Objective   Vitals: Blood  "pressure 132/78, pulse 64, height 5' 4\" (1.626 m), weight 81.2 kg (179 lb).,Body mass index is 30.73 kg/m².    PE:  AAOx 3  WDWN  Hearing intact, no drainage from eyes  Regular rate  no audible wheezing  no abdominal distension  LE compartments soft, skin intact    rightknee:    Appearance:  No  swelling   No  ecchymosis  Yes  obvious joint deformity   No  effusion   Palpation/Tenderness:  No  TTP over medial joint line  No  TTP over lateral joint line   No  TTP over patella  No  TTP over patellar tendon  No  TTP over pes anserine bursa  Active Range of Motion:  AROM: 3- 110  Special Tests:  Valgus Stress Test: negative  Varus Stress Test: negative    Left knee:  Well healed anterior scar  No swelling  AROM 3- 110 degrees   Stable to varus and valgus stress at 0 and 30 degrees       Imaging Studies:  I have personally reviewed pertinent films in PACS  X-raybilateral knee: status post left total knee with hardware intact and well aligned, no interval changes. Severe right knee osteoarthritis with complete bone on bone joint space narrowing.         Scribe Attestation      I,:  Ela Goldstein PA-C am acting as a scribe while in the presence of the attending physician.:       I,:  Deirdre Clinton DO personally performed the services described in this documentation    as scribed in my presence.:                "

## 2025-06-30 ENCOUNTER — TELEPHONE (OUTPATIENT)
Dept: OBGYN CLINIC | Facility: MEDICAL CENTER | Age: 70
End: 2025-06-30

## 2025-06-30 DIAGNOSIS — Z96.652 STATUS POST TOTAL KNEE REPLACEMENT, LEFT: Primary | ICD-10-CM

## 2025-06-30 RX ORDER — AMOXICILLIN 500 MG/1
2000 CAPSULE ORAL
Qty: 12 CAPSULE | Refills: 0 | Status: SHIPPED | OUTPATIENT
Start: 2025-06-30 | End: 2025-07-01

## 2025-06-30 NOTE — TELEPHONE ENCOUNTER
Patient called the RX Refill Line. Message is being forwarded to the office.     Patient is requesting antibiotic to be sent to [facility] [place], [address],  prior to dental appt on 7/16/25    Please contact patient at [phone]

## (undated) DEVICE — DUAL CUT SAGITTAL BLADE

## (undated) DEVICE — SUT VICRYL 1 CTX 36 IN J977H

## (undated) DEVICE — 2108 SERIES SAGITTAL BLADE (28.9 X 0.64 X 58.7MM)

## (undated) DEVICE — SIGMOIDOSCOPIC SUCTION INSTRUMENT 18 FR W/WINGED CAP CONTROL AND 6 FOOT (1.8M) TUBING: Brand: SIGMOIDOSCOPIC

## (undated) DEVICE — MAYO STAND COVER: Brand: CONVERTORS

## (undated) DEVICE — STERILE POLYISOPRENE POWDER-FREE SURGICAL GLOVES WITH EMOLLIENT COATING: Brand: PROTEXIS

## (undated) DEVICE — FRAZIER SUCTION INSTRUMENT 18 FR W/OBTURATOR, NO CONTROL VENT: Brand: FRAZIER

## (undated) DEVICE — SYRINGE 30ML LL

## (undated) DEVICE — WEBRIL 6 IN UNSTERILE

## (undated) DEVICE — CHLORAPREP HI-LITE 26ML ORANGE

## (undated) DEVICE — 3M™ STERI-STRIP™ REINFORCED ADHESIVE SKIN CLOSURES, R1547, 1/2 IN X 4 IN (12 MM X 100 MM), 6 STRIPS/ENVELOPE: Brand: 3M™ STERI-STRIP™

## (undated) DEVICE — STOCKINETTE,IMPERVIOUS,12X48,STERILE: Brand: MEDLINE

## (undated) DEVICE — STERILE POLYISOPRENE POWDER-FREE SURGICAL GLOVES: Brand: PROTEXIS

## (undated) DEVICE — 2108 SERIES SAGITTAL BLADE, OFFSET (12.4 X 1.24 X 73.7MM)

## (undated) DEVICE — SKIN MARKER DUAL TIP WITH RULER CAP, FLEXIBLE RULER AND LABELS: Brand: DEVON

## (undated) DEVICE — BETHLEHEM UNIV TOTAL KNEE, KIT: Brand: CARDINAL HEALTH

## (undated) DEVICE — HANDPIECE SET WITH RETRACTABLE COAXIAL FAN SPRAY TIP AND SUCTION TUBE: Brand: INTERPULSE

## (undated) DEVICE — SPONGE LAP 18 X 18 IN STRL RFD

## (undated) DEVICE — 4-PORT MANIFOLD: Brand: NEPTUNE 2

## (undated) DEVICE — 3M™ STERI-DRAPE™ U-DRAPE 1015: Brand: STERI-DRAPE™

## (undated) DEVICE — DRESSING MEPILEX AG BORDER POST-OP 4 X 12 IN

## (undated) DEVICE — GLOVE SRG BIOGEL PI ORTHOPEDIC 7

## (undated) DEVICE — SUT MONOCRYL 4-0 PS-2 27 IN Y426H

## (undated) DEVICE — DISPOSABLE OR TOWEL: Brand: CARDINAL HEALTH

## (undated) DEVICE — NEEDLE 18 G X 1 1/2

## (undated) DEVICE — 450 ML BOTTLE OF 0.05% CHLORHEXIDINE GLUCONATE IN 99.95% STERILE WATER FOR IRRIGATION, USP AND APPLICATOR.: Brand: IRRISEPT ANTIMICROBIAL WOUND LAVAGE

## (undated) DEVICE — SURGICAL GOWN, XL SMARTSLEEVE: Brand: CONVERTORS

## (undated) DEVICE — HOOD: Brand: T7PLUS

## (undated) DEVICE — CAPIT KNEE ATTUNE FB W/DOM

## (undated) DEVICE — NEPTUNE E-SEP SMOKE EVACUATION PENCIL, COATED, 70MM BLADE, PUSH BUTTON SWITCH: Brand: NEPTUNE E-SEP

## (undated) DEVICE — INTENDED FOR TISSUE SEPARATION, AND OTHER PROCEDURES THAT REQUIRE A SHARP SURGICAL BLADE TO PUNCTURE OR CUT.: Brand: BARD-PARKER ® SAFETYLOCK CARBON RIB-BACK BLADES

## (undated) DEVICE — CUFF TOURNIQUET 30 X 4 IN QUICK CONNECT DISP 1BLA

## (undated) DEVICE — SUT VICRYL 2-0 CT-1 36 IN J945H

## (undated) DEVICE — STIRRUP STRAP ADULT DISP

## (undated) DEVICE — BASIC SINGLE BASIN 2-LF: Brand: MEDLINE INDUSTRIES, INC.

## (undated) DEVICE — CEMENT MIXING SYSTEM WITH FEMORAL BREAKWAY NOZZLE: Brand: REVOLUTION

## (undated) DEVICE — U-DRAPE: Brand: CONVERTORS

## (undated) RX ORDER — ACETAZOLAMIDE 250 MG/1: 1 TABLET ORAL

## (undated) RX ORDER — TAFLUPROST 0 MG/.3ML
1 SOLUTION/ DROPS OPHTHALMIC EVERY EVENING
Start: 2022-03-17

## (undated) RX ORDER — DORZOLAMIDE HYDROCHLORIDE TIMOLOL MALEATE 20; 5 MG/ML; MG/ML
1 SOLUTION/ DROPS OPHTHALMIC TWICE A DAY
End: 2022-02-03

## (undated) RX ORDER — BRIMONIDINE TARTRATE, TIMOLOL MALEATE 2; 5 MG/ML; MG/ML
1 SOLUTION/ DROPS OPHTHALMIC TWICE A DAY
Start: 2022-02-03